# Patient Record
Sex: MALE | Race: WHITE | NOT HISPANIC OR LATINO | Employment: FULL TIME | URBAN - METROPOLITAN AREA
[De-identification: names, ages, dates, MRNs, and addresses within clinical notes are randomized per-mention and may not be internally consistent; named-entity substitution may affect disease eponyms.]

---

## 2017-01-19 ENCOUNTER — ALLSCRIPTS OFFICE VISIT (OUTPATIENT)
Dept: OTHER | Facility: OTHER | Age: 47
End: 2017-01-19

## 2017-01-19 DIAGNOSIS — R10.13 EPIGASTRIC PAIN: ICD-10-CM

## 2017-01-26 ENCOUNTER — ALLSCRIPTS OFFICE VISIT (OUTPATIENT)
Dept: OTHER | Facility: OTHER | Age: 47
End: 2017-01-26

## 2017-01-26 LAB
FLUAV AG SPEC QL IA: NEGATIVE
INFLUENZA B AG (HISTORICAL): NEGATIVE

## 2017-02-08 ENCOUNTER — APPOINTMENT (OUTPATIENT)
Dept: LAB | Facility: HOSPITAL | Age: 47
End: 2017-02-08
Attending: INTERNAL MEDICINE
Payer: COMMERCIAL

## 2017-02-08 ENCOUNTER — TRANSCRIBE ORDERS (OUTPATIENT)
Dept: ADMINISTRATIVE | Facility: HOSPITAL | Age: 47
End: 2017-02-08

## 2017-02-08 DIAGNOSIS — R10.13 EPIGASTRIC PAIN: ICD-10-CM

## 2017-02-08 DIAGNOSIS — R10.13 ABDOMINAL PAIN, EPIGASTRIC: Primary | ICD-10-CM

## 2017-02-08 DIAGNOSIS — R10.13 ABDOMINAL PAIN, EPIGASTRIC: ICD-10-CM

## 2017-02-08 LAB
ALBUMIN SERPL BCP-MCNC: 3.5 G/DL (ref 3.5–5)
ALP SERPL-CCNC: 99 U/L (ref 46–116)
ALT SERPL W P-5'-P-CCNC: 39 U/L (ref 12–78)
ANION GAP SERPL CALCULATED.3IONS-SCNC: 8 MMOL/L (ref 4–13)
AST SERPL W P-5'-P-CCNC: 19 U/L (ref 5–45)
BILIRUB SERPL-MCNC: 0.27 MG/DL (ref 0.2–1)
BUN SERPL-MCNC: 17 MG/DL (ref 5–25)
CALCIUM SERPL-MCNC: 8.2 MG/DL (ref 8.3–10.1)
CHLORIDE SERPL-SCNC: 105 MMOL/L (ref 100–108)
CO2 SERPL-SCNC: 29 MMOL/L (ref 21–32)
CREAT SERPL-MCNC: 0.92 MG/DL (ref 0.6–1.3)
GFR SERPL CREATININE-BSD FRML MDRD: >60 ML/MIN/1.73SQ M
GLUCOSE SERPL-MCNC: 93 MG/DL (ref 65–140)
LIPASE SERPL-CCNC: 311 U/L (ref 73–393)
POTASSIUM SERPL-SCNC: 4 MMOL/L (ref 3.5–5.3)
PROT SERPL-MCNC: 6.5 G/DL (ref 6.4–8.2)
SODIUM SERPL-SCNC: 142 MMOL/L (ref 136–145)

## 2017-02-08 PROCEDURE — 83690 ASSAY OF LIPASE: CPT

## 2017-02-08 PROCEDURE — 36415 COLL VENOUS BLD VENIPUNCTURE: CPT

## 2017-02-08 PROCEDURE — 83516 IMMUNOASSAY NONANTIBODY: CPT

## 2017-02-08 PROCEDURE — 82784 ASSAY IGA/IGD/IGG/IGM EACH: CPT

## 2017-02-08 PROCEDURE — 80053 COMPREHEN METABOLIC PANEL: CPT

## 2017-02-08 PROCEDURE — 86255 FLUORESCENT ANTIBODY SCREEN: CPT

## 2017-02-09 LAB
ENDOMYSIUM IGA SER QL: NEGATIVE
GLIADIN PEPTIDE IGA SER-ACNC: 4 UNITS (ref 0–19)
GLIADIN PEPTIDE IGG SER-ACNC: 2 UNITS (ref 0–19)
IGA SERPL-MCNC: 177 MG/DL (ref 90–386)
TTG IGA SER-ACNC: <2 U/ML (ref 0–3)
TTG IGG SER-ACNC: <2 U/ML (ref 0–5)

## 2017-02-10 RX ORDER — IVERMECTIN 10 MG/G
CREAM TOPICAL
COMMUNITY
End: 2022-07-14 | Stop reason: ALTCHOICE

## 2017-02-10 RX ORDER — AZELAIC ACID 0.15 G/G
GEL TOPICAL 2 TIMES DAILY
COMMUNITY
End: 2018-03-12 | Stop reason: ALTCHOICE

## 2017-02-13 ENCOUNTER — ANESTHESIA (OUTPATIENT)
Dept: GASTROENTEROLOGY | Facility: HOSPITAL | Age: 47
End: 2017-02-13
Payer: COMMERCIAL

## 2017-02-13 ENCOUNTER — ANESTHESIA EVENT (OUTPATIENT)
Dept: GASTROENTEROLOGY | Facility: HOSPITAL | Age: 47
End: 2017-02-13
Payer: COMMERCIAL

## 2017-02-13 ENCOUNTER — GENERIC CONVERSION - ENCOUNTER (OUTPATIENT)
Dept: OTHER | Facility: OTHER | Age: 47
End: 2017-02-13

## 2017-02-13 ENCOUNTER — HOSPITAL ENCOUNTER (OUTPATIENT)
Facility: HOSPITAL | Age: 47
Setting detail: OUTPATIENT SURGERY
Discharge: HOME | End: 2017-02-13
Attending: INTERNAL MEDICINE | Admitting: INTERNAL MEDICINE
Payer: COMMERCIAL

## 2017-02-13 VITALS
OXYGEN SATURATION: 98 % | WEIGHT: 158 LBS | TEMPERATURE: 97.5 F | HEIGHT: 69 IN | SYSTOLIC BLOOD PRESSURE: 114 MMHG | BODY MASS INDEX: 23.4 KG/M2 | HEART RATE: 82 BPM | RESPIRATION RATE: 16 BRPM | DIASTOLIC BLOOD PRESSURE: 71 MMHG

## 2017-02-13 DIAGNOSIS — R10.13 EPIGASTRIC PAIN: ICD-10-CM

## 2017-02-13 DIAGNOSIS — K63.5 POLYP OF COLON: ICD-10-CM

## 2017-02-13 PROCEDURE — 88342 IMHCHEM/IMCYTCHM 1ST ANTB: CPT | Performed by: INTERNAL MEDICINE

## 2017-02-13 PROCEDURE — 88313 SPECIAL STAINS GROUP 2: CPT | Performed by: INTERNAL MEDICINE

## 2017-02-13 PROCEDURE — 88305 TISSUE EXAM BY PATHOLOGIST: CPT | Performed by: INTERNAL MEDICINE

## 2017-02-13 RX ORDER — SODIUM CHLORIDE 9 MG/ML
50 INJECTION, SOLUTION INTRAVENOUS CONTINUOUS
Status: DISCONTINUED | OUTPATIENT
Start: 2017-02-13 | End: 2017-02-13 | Stop reason: HOSPADM

## 2017-02-13 RX ORDER — PROPOFOL 10 MG/ML
INJECTION, EMULSION INTRAVENOUS AS NEEDED
Status: DISCONTINUED | OUTPATIENT
Start: 2017-02-13 | End: 2017-02-13 | Stop reason: SURG

## 2017-02-13 RX ADMIN — PROPOFOL 50 MG: 10 INJECTION, EMULSION INTRAVENOUS at 14:51

## 2017-02-13 RX ADMIN — PROPOFOL 50 MG: 10 INJECTION, EMULSION INTRAVENOUS at 15:03

## 2017-02-13 RX ADMIN — PROPOFOL 50 MG: 10 INJECTION, EMULSION INTRAVENOUS at 14:55

## 2017-02-13 RX ADMIN — PROPOFOL 100 MG: 10 INJECTION, EMULSION INTRAVENOUS at 14:41

## 2017-02-13 RX ADMIN — PROPOFOL 50 MG: 10 INJECTION, EMULSION INTRAVENOUS at 14:43

## 2017-02-13 RX ADMIN — SODIUM CHLORIDE 50 ML/HR: 0.9 INJECTION, SOLUTION INTRAVENOUS at 13:51

## 2017-02-13 RX ADMIN — PROPOFOL 50 MG: 10 INJECTION, EMULSION INTRAVENOUS at 14:45

## 2017-02-13 RX ADMIN — PROPOFOL 50 MG: 10 INJECTION, EMULSION INTRAVENOUS at 14:57

## 2017-02-13 RX ADMIN — PROPOFOL 50 MG: 10 INJECTION, EMULSION INTRAVENOUS at 14:48

## 2017-02-14 ENCOUNTER — GENERIC CONVERSION - ENCOUNTER (OUTPATIENT)
Dept: OTHER | Facility: OTHER | Age: 47
End: 2017-02-14

## 2017-02-16 ENCOUNTER — GENERIC CONVERSION - ENCOUNTER (OUTPATIENT)
Dept: OTHER | Facility: OTHER | Age: 47
End: 2017-02-16

## 2017-03-17 ENCOUNTER — ALLSCRIPTS OFFICE VISIT (OUTPATIENT)
Dept: OTHER | Facility: OTHER | Age: 47
End: 2017-03-17

## 2017-06-26 ENCOUNTER — ALLSCRIPTS OFFICE VISIT (OUTPATIENT)
Dept: OTHER | Facility: OTHER | Age: 47
End: 2017-06-26

## 2017-07-11 ENCOUNTER — ALLSCRIPTS OFFICE VISIT (OUTPATIENT)
Dept: OTHER | Facility: OTHER | Age: 47
End: 2017-07-11

## 2017-09-08 ENCOUNTER — ALLSCRIPTS OFFICE VISIT (OUTPATIENT)
Dept: OTHER | Facility: OTHER | Age: 47
End: 2017-09-08

## 2017-10-17 ENCOUNTER — ALLSCRIPTS OFFICE VISIT (OUTPATIENT)
Dept: OTHER | Facility: OTHER | Age: 47
End: 2017-10-17

## 2017-10-18 NOTE — PROGRESS NOTES
Assessment  1  Anxiety disorder (300 00) (F41 9)    Plan  Anxiety disorder    · Escitalopram Oxalate 5 MG Oral Tablet; 1 tab PO daily    Discussion/Summary  Discussion Summary:   Rto 4-6months  Counseling Documentation With Imm: The patient was counseled regarding impressions  Medication SE Review and Pt Understands Tx: Possible side effects of new medications were reviewed with the patient/guardian today  The treatment plan was reviewed with the patient/guardian  The patient/guardian understands and agrees with the treatment plan      Chief Complaint  Chief Complaint Free Text Note Form: 6 week follow-up   Chief Complaint Chronic Condition [facility]: Patient is here today for follow up of chronic conditions described in HPI  History of Present Illness  HPI: Patient was started on Lexapro last month for anxietyis doing very well on thishave improved significantlypanic attackshas been able to stay over at partner's place without trouble sleepingwas actually able to go into a haunted house with his friends and felt finetrouble concentrating at worknoted resolution of loose stoolsa little fuzzy in the beginninghas noted delayed orgasm         Review of Systems  Complete-Male:   Constitutional: no fever,-- no recent weight gain-- and-- no chills  Cardiovascular: no chest pain-- and-- no palpitations  Respiratory: no shortness of breath-- and-- no cough  Gastrointestinal: no abdominal pain,-- no constipation-- and-- no diarrhea  Psychiatric: as noted in HPI  Active Problems  1  Anxiety disorder (300 00) (F41 9)   2  DM (diabetes mellitus screen) (V77 1) (Z13 1)   3  Laboratory examination ordered as part of a routine general medical examination (V72 62) (Z00 00)   4  Long term use of drug (V58 69) (Z79 899)   5  Paresthesia of both feet (782 0) (R20 2)   6  Polyp, colonic (211 3) (K63 5)   7  Rosacea (695 3) (L71 9)   8  Seizure disorder (345 90) (G40 909)    Past Medical History  1   History of Abdominal bloating (787 3) (R14 0)   2  History of Acute upper respiratory infection (465 9) (J06 9)   3  History of Asthma (493 90) (J45 909)   4  History of Atypical chest pain (786 59) (R07 89)   5  History of Dyspepsia (536 8) (R10 13)   6  History of Essential hypertriglyceridemia (272 1) (E78 1)   7  History of acute sinusitis (V12 69) (Z87 09)   8  History of epigastric pain (V12 79) (Z87 19)   9  History of Mild carpal tunnel syndrome, right (354 0) (G56 01)  Active Problems And Past Medical History Reviewed: The active problems and past medical history were reviewed and updated today  Surgical History  1  History of Dental Surgery  Surgical History Reviewed: The surgical history was reviewed and updated today  Family History  Maternal Grandmother    1  Family history of cerebrovascular accident (V17 1) (Z82 3)   2  Family history of colon cancer (V16 0) (Z80 0)  Family History Reviewed: The family history was reviewed and updated today  Social History   · Never a smoker   · Non smoker (V49 89) (Z78 9)  Social History Reviewed: The social history was reviewed and updated today  Current Meds   1  ALPRAZolam 0 25 MG Oral Tablet; 1 tab PO once daily as needed for a panic attack; Therapy: 99BAG7129 to (Evaluate:08Oct2017); Last Rx:54Meo3868 Ordered   2  CarBAMazepine  MG Oral Capsule Extended Release 12 Hour; TAKE 1 CAPSULE TWICE   DAILY; Therapy: 33Aul0203 to (Evaluate:14Oct2017)  Requested for: 24Fqj2524; Last Rx:67Cle2960   Ordered   3  Escitalopram Oxalate 5 MG Oral Tablet; 1 tab PO daily; Therapy: 73YSG2261 to (Last Rx:17Nyl0451)  Requested for: 65Bbe6832 Ordered   4  Omeprazole 20 MG Oral Capsule Delayed Release; take 1 capsule daily; Therapy: 61RUA6578 to (Evaluate:12Mar2018)  Requested for: 51PLU6181; Last Rx:17Mar2017   Ordered   5  Soolantra 1 % External Cream;   Therapy: (Recorded:19Jan2017) to Recorded  Medication List Reviewed:    The medication list was reviewed and updated today  Allergies  1  No Known Drug Allergies    Vitals  Vital Signs    Recorded: 28PAR6916 11:42AM   Heart Rate 84   Systolic 451   Diastolic 74   Height 5 ft 9 in   Weight 172 lb 8 oz   BMI Calculated 25 47   BSA Calculated 1 94   O2 Saturation 96     Physical Exam    Constitutional   General appearance: No acute distress, well appearing and well nourished  Ears, Nose, Mouth, and Throat   Otoscopic examination: Tympanic membrance translucent with normal light reflex  Canals patent without erythema  Oropharynx: Normal with no erythema, edema, exudate or lesions  Pulmonary   Respiratory effort: No increased work of breathing or signs of respiratory distress  Auscultation of lungs: Clear to auscultation, equal breath sounds bilaterally, no wheezes, no rales, no rhonci  Cardiovascular   Auscultation of heart: Normal rate and rhythm, normal S1 and S2, without murmurs  Abdomen   Abdomen: Non-tender, no masses  Musculoskeletal   Gait and station: Normal     Psychiatric   Orientation to person, place and time: Normal     Mood and affect: Normal          Health Management  History of Abdominal bloating   COLONOSCOPY; every 3 years; Last 33IYU4325; Next Due: 28Lpw2248; Active  History of Dyspepsia   COLONOSCOPY; every 3 years; Last 94AZD9117; Next Due: 07Mwx7045; Active  History of epigastric pain   COLONOSCOPY; every 3 years; Last 96MRF7339; Next Due: 40Ieq2470; Active  Polyp, colonic   COLONOSCOPY; every 3 years; Last 33WGA0273; Next Due: 06Jyz6140; Active    Future Appointments    Date/Time Provider Specialty Site   03/12/2018 08:00 AM JAYA Vu   Internal Medicine [facility]     Signatures   Electronically signed by : JAYA Babcock ; Oct 17 2017  3:29PM EST                       (Author)

## 2017-11-16 ENCOUNTER — GENERIC CONVERSION - ENCOUNTER (OUTPATIENT)
Dept: OTHER | Facility: OTHER | Age: 47
End: 2017-11-16

## 2018-01-10 NOTE — RESULT NOTES
Message   negative     Verified Results  (1) COMPREHENSIVE METABOLIC PANEL 45AWB3234 50:90CX [employer] Order Number: [id number]     Test Name Result Flag Reference   GLUCOSE,RANDM 93 mg/dL     If the patient is fasting, the ADA then defines impaired fasting glucose as > 100 mg/dL and diabetes as > or equal to 123 mg/dL  SODIUM 142 mmol/L  136-145   POTASSIUM 4 0 mmol/L  3 5-5 3   CHLORIDE 105 mmol/L  100-108   CARBON DIOXIDE 29 mmol/L  21-32   ANION GAP (CALC) 8 mmol/L  4-13   BLOOD UREA NITROGEN 17 mg/dL  5-25   CREATININE 0 92 mg/dL  0 60-1 30   Standardized to IDMS reference method   CALCIUM 8 2 mg/dL L 8 3-10 1   BILI, TOTAL 0 27 mg/dL  0 20-1 00   ALK PHOSPHATAS 99 U/L     ALT (SGPT) 39 U/L  12-78   AST(SGOT) 19 U/L  5-45   ALBUMIN 3 5 g/dL  3 5-5 0   TOTAL PROTEIN 6 5 g/dL  6 4-8 2   eGFR Non-African American      >60 0 ml/min/1 73sq [facility] [employer] Disease Education Program recommendations are as follows:  GFR calculation is accurate only with a steady state creatinine  Chronic Kidney disease less than 60 ml/min/1 73 sq  meters  Kidney failure less than 15 ml/min/1 73 sq  meters  (1) LIPASE 46NPA0666 08:11AM [employer] Order Number: [id number]     Test Name Result Flag Reference   LIPASE 311 u/L       (1) CELIAC DISEASE AB PROFILE 75AVF1886 08:11AM Nannette Bracket     Test Name Result Flag Reference   tTG IGG <2 U/mL  0 - 5   Negative        0 - 5                                Weak Positive   6 - 9                                Positive           >9   tTG IGA <2 U/mL  0 - 3   Negative        0 -  3                                Weak Positive   4 - 10                                Positive           >10   Tissue Transglutaminase (tTG) has been identified   as the endomysial antigen  Studies have demonstr-   ated that endomysial IgA antibodies have over 99%   specificity for gluten sensitive enteropathy     GLIADA 4 units  0 - 19   Negative 0 - 19                     Weak Positive             20 - 30                     Moderate to Strong Positive   >30   GLIADG 2 units  0 - 19   Negative                   0 - 19                     Weak Positive             20 - 30                     Moderate to Strong Positive   >30   ENDOMYSIAL AB IGA Negative  Negative   Performed at:  [address] [place] [address]  [id number]  : Ksenia Jimenez MD, Phone:  [phone]    mg/dL  90 - 386

## 2018-01-10 NOTE — RESULT NOTES
Message   Within normal limits  Verified Results  (1) COMPREHENSIVE METABOLIC PANEL 44AVS5494 14:21BE Karl Rangeljuliette Order Number: [id number]     Test Name Result Flag Reference   GLUCOSE,RANDM 93 mg/dL     If the patient is fasting, the ADA then defines impaired fasting glucose as > 100 mg/dL and diabetes as > or equal to 123 mg/dL  SODIUM 142 mmol/L  136-145   POTASSIUM 4 0 mmol/L  3 5-5 3   CHLORIDE 105 mmol/L  100-108   CARBON DIOXIDE 29 mmol/L  21-32   ANION GAP (CALC) 8 mmol/L  4-13   BLOOD UREA NITROGEN 17 mg/dL  5-25   CREATININE 0 92 mg/dL  0 60-1 30   Standardized to IDMS reference method   CALCIUM 8 2 mg/dL L 8 3-10 1   BILI, TOTAL 0 27 mg/dL  0 20-1 00   ALK PHOSPHATAS 99 U/L     ALT (SGPT) 39 U/L  12-78   AST(SGOT) 19 U/L  5-45   ALBUMIN 3 5 g/dL  3 5-5 0   TOTAL PROTEIN 6 5 g/dL  6 4-8 2   eGFR Non-African American      >60 0 ml/min/1 73sq [facility] Energy Disease Education Program recommendations are as follows:  GFR calculation is accurate only with a steady state creatinine  Chronic Kidney disease less than 60 ml/min/1 73 sq  meters  Kidney failure less than 15 ml/min/1 73 sq  meters       (1) LIPASE 72HSS2432 08:11AM Karl Ruby Order Number: [id number]     Test Name Result Flag Reference   LIPASE 311 u/L

## 2018-01-10 NOTE — RESULT NOTES
Message   Stress test is normal        Verified Results  STRESS TEST ONLY, EXERCISE 98EXJ8601 02:10PM Daisha Islas Order Number: [id number]     Test Name Result Flag Reference   STRESS TEST ONLY, EXERCISE (Report)     [facility]   [address]  [place], [address]   [phone]     Stress Electrocardiography during Exercise     Name: Mathew Maurice   MR #: [id number]   Account #: [de-identified]   Study date: 2016   : 1970   Age: 41 years   Gender: Male   Height: 69 in   Weight: 167 lb   BSA: 1 92 m squared     Allergies: ALLERGIES NOT ON FILE     Diagnosis: 786 50 - CHEST PAIN NOS     Primary Physician: Trinity Shipley MD   Referring Physician: Trinity Shipley MD   Technician: Karolina Hannah   GROUP: [facility]   Interpreting Physician: Twin Johansen MD   Report Prepared By[de-identified] Karolina Hannah   Ordering Physician: Twin Johansen MD     CLINICAL QUESTION: Detection of coronary artery disease  HISTORY: The patient is a 41year old  male  Chest pain status: chest   pain, consistent with atypical angina  Coronary artery disease risk factors:   none  Cardiovascular history: none significant  REST ECG: Normal baseline ECG  PROCEDURE: Treadmill exercise testing was performed, using the Zbigniew protocol  Stress electrocardiographic evaluation was performed  ZBIGNIEW PROTOCOL:   HR bpm SBP mmHg DBP mmHg Symptoms   Baseline 101 146 100 none   Stage 1 110 178 80 none   Stage 2 137 183 82 none   Stage 3 155 212 87 leg pain   Stage 4 171 189 85 same as above   Immediate 171 212 87 --   Recovery 1 118 171 84 subsiding   Recovery 2 105 156 77 none   FAREED Vasquez-C was present throughout testing  Pre Test POX 98%   Post Test POX 96% No medications or fluids given  STRESS SUMMARY: Stage 1 was advanced per Tamiko Sepulveda PA-C Duration of exercise   was 9 min   Functional capacity was normal  Maximal heart rate during stress was 171 bpm ( 97 % of maximal predicted heart rate)  The heart rate response to   stress was normal  There was resting hypertension with a hypertensive blood   pressure response to stress  The rate-pressure product for the peak heart rate   and blood pressure was 52822  There was no chest pain during stress  The stress   test was terminated due to achievement of maximal (symptom limited) exercise,   achievement of target heart rate, and moderate fatigue  The stress ECG was   negative for ischemia  Mildly hypertensive response to exercise  There were no   stress arrhythmias or conduction abnormalities  SUMMARY:   - Rest ECG: Normal baseline ECG  - Stress results: Duration of exercise was 9 min  Target heart rate was   achieved  There was resting hypertension with a hypertensive blood pressure   response to stress  There was no chest pain during stress  The stress test was   terminated due to achievement of maximal (symptom limited) exercise,   achievement of target heart rate, and moderate fatigue  - ECG conclusions: The   stress ECG was negative for ischemia  Mildly hypertensive response to exercise  IMPRESSIONS: Normal study after maximal exercise without reproduction of   symptoms  PREVIOUS STUDY COMPARISON: No previous study is available for comparison       Prepared and signed by     River Salvador MD   Signed 06/14/2016 15:58:43       Signatures   Electronically signed by : JAYA Rojas ; Jun 14 2016  8:17PM EST                       (Author)

## 2018-01-12 VITALS
WEIGHT: 169.13 LBS | OXYGEN SATURATION: 98 % | HEIGHT: 69 IN | DIASTOLIC BLOOD PRESSURE: 80 MMHG | SYSTOLIC BLOOD PRESSURE: 152 MMHG | HEART RATE: 82 BPM | BODY MASS INDEX: 25.05 KG/M2

## 2018-01-12 VITALS
BODY MASS INDEX: 24.29 KG/M2 | SYSTOLIC BLOOD PRESSURE: 104 MMHG | HEIGHT: 69 IN | DIASTOLIC BLOOD PRESSURE: 68 MMHG | TEMPERATURE: 97.1 F | WEIGHT: 164 LBS | OXYGEN SATURATION: 99 % | HEART RATE: 85 BPM | RESPIRATION RATE: 18 BRPM

## 2018-01-12 VITALS
WEIGHT: 172.5 LBS | DIASTOLIC BLOOD PRESSURE: 74 MMHG | OXYGEN SATURATION: 96 % | HEIGHT: 69 IN | HEART RATE: 84 BPM | BODY MASS INDEX: 25.55 KG/M2 | SYSTOLIC BLOOD PRESSURE: 126 MMHG

## 2018-01-12 VITALS
HEART RATE: 99 BPM | BODY MASS INDEX: 24.59 KG/M2 | OXYGEN SATURATION: 98 % | SYSTOLIC BLOOD PRESSURE: 132 MMHG | TEMPERATURE: 99.3 F | DIASTOLIC BLOOD PRESSURE: 84 MMHG | HEIGHT: 69 IN | WEIGHT: 166 LBS

## 2018-01-13 VITALS
DIASTOLIC BLOOD PRESSURE: 74 MMHG | BODY MASS INDEX: 25.53 KG/M2 | TEMPERATURE: 96.8 F | SYSTOLIC BLOOD PRESSURE: 132 MMHG | WEIGHT: 172.38 LBS | HEART RATE: 85 BPM | OXYGEN SATURATION: 99 % | HEIGHT: 69 IN

## 2018-01-13 VITALS
HEART RATE: 80 BPM | DIASTOLIC BLOOD PRESSURE: 90 MMHG | TEMPERATURE: 98.4 F | SYSTOLIC BLOOD PRESSURE: 120 MMHG | WEIGHT: 169.04 LBS | RESPIRATION RATE: 18 BRPM | HEIGHT: 69 IN | OXYGEN SATURATION: 97 % | BODY MASS INDEX: 25.04 KG/M2

## 2018-01-14 VITALS
OXYGEN SATURATION: 96 % | DIASTOLIC BLOOD PRESSURE: 80 MMHG | WEIGHT: 167.38 LBS | HEIGHT: 69 IN | TEMPERATURE: 99 F | SYSTOLIC BLOOD PRESSURE: 154 MMHG | BODY MASS INDEX: 24.79 KG/M2 | HEART RATE: 83 BPM

## 2018-01-14 NOTE — RESULT NOTES
Verified Results  COLONOSCOPY [id number] 02:10PM Alli Mcdaniel     Test Name Result Flag Reference   Colonoscopy 02/13/2017        Summary / No summary entered :      No summary entered   Documents attached :      EPIC OP CATRACHO Wiggins; Enc: [id number] - Appointment - Jemma Moreno - (Gastroenterology Adult) (Additional Information Document)

## 2018-01-17 NOTE — RESULT NOTES
Message   Repeat in 3 years as tubular adenoma ~10 mm was found     Verified Results  (1) TISSUE EXAM [id number] 02:45PM Kerrie Nicolas     Test Name Result Flag Reference   LAB AP CASE REPORT (Report)     Surgical Pathology Report             Case: [id number]                   Authorizing Provider: Eufemia Glover MD      Collected:      02/13/2017 1445        Ordering Location:   [address] [place] [address]   Received:      02/14/2017 [place] [address] Endoscopy                               Pathologist:      Deni Santana MD                             Specimens:  A) - Duodenum, Duodenum, r/o celiac                                  B) - Stomach, Erosive gastritis                                    C) - Large Intestine, Transverse Colon, Transverse colon polyp   LAB AP FINAL DIAGNOSIS (Report)     A  Duodenum, biopsy:  - Benign duodenal mucosa  - No villous atrophy, intraepithelial lymphocytosis or crypt hyperplasia;   negative for features of malabsorptive enteropathy   - Negative for chronic or active duodenitis, dysplasia or malignancy  B  Stomach, biopsy:  - Chronic inactive oxyntic and antral gastritis  - Negative for Helicobacter pylori, confirmed by immunohistochemical   stain, evaluated with appropriate positive controls  - Negative for atrophy, intestinal metaplasia, dysplasia or carcinoma  - Alcian blue/PAS stain is negative for intestinal type mucin  C  Colon, transverse polyp, biopsy:  - Tubular adenoma, negative for high-grade dysplasia  Electronically signed by Deni Santana MD on 2/15/2017 at 1:02 PM   LAB AP SURGICAL ADDITIONAL INFORMATION (Report)     These tests were developed and their performance characteristics   determined by [facility] or [facility]  They may not be cleared or approved by the [place]  Food and   Drug Administration  The FDA has determined that such clearance or   approval is not necessary   These tests are used for clinical purposes  They should not be regarded as investigational or for research  This   laboratory has been approved by BAR 88, designated as a high-complexity   laboratory and is qualified to perform these tests  Interpretation performed at , [address]   LAB AP GROSS DESCRIPTION (Report)     A  The specimen is received in formalin, labeled with the patient's name   and hospital number, and is designated duodenal biopsy, rule out   celiac  The specimen consists of multiple white to tan, friable soft   tissue fragments measuring in aggregate 0 7 x 0 6 x 0 2 cm in greatest   dimension  Entirely submitted  One cassette  B  The specimen is received in formalin, labeled with the patient's name   and hospital number, and is designated Stomach biopsy, erosive   gastritis  The specimen consists of 5 white to tan soft tissue fragments   measuring 0 2, 0 2, 0 3, 0 4 and 0 7 centimeters in greatest dimension  Entirely submitted  One cassette  Note: The estimated total formalin fixation time based upon information   provided by the submitting clinician and the standard processing schedule   is 23 25 hours  C  The specimen is received in formalin, labeled with the patient's name   and hospital number, and is designated Transverse colon polyp  The   specimen consists of a single white to tan soft tissue fragment measuring   0 7 x 0 5 x 0 3 cm in greatest dimension  Entirely submitted  One   cassette  Note: The estimated total formalin fixation time based upon information   provided by the submitting clinician and the standard processing schedule   is 23 0 hours        ACL

## 2018-01-22 VITALS
DIASTOLIC BLOOD PRESSURE: 80 MMHG | SYSTOLIC BLOOD PRESSURE: 144 MMHG | WEIGHT: 176 LBS | BODY MASS INDEX: 26.07 KG/M2 | HEART RATE: 87 BPM | OXYGEN SATURATION: 98 % | HEIGHT: 69 IN

## 2018-03-12 ENCOUNTER — OFFICE VISIT (OUTPATIENT)
Dept: INTERNAL MEDICINE CLINIC | Facility: CLINIC | Age: 48
End: 2018-03-12
Payer: COMMERCIAL

## 2018-03-12 VITALS
WEIGHT: 184.6 LBS | BODY MASS INDEX: 27.34 KG/M2 | HEART RATE: 84 BPM | SYSTOLIC BLOOD PRESSURE: 136 MMHG | HEIGHT: 69 IN | OXYGEN SATURATION: 98 % | DIASTOLIC BLOOD PRESSURE: 92 MMHG | TEMPERATURE: 98.6 F

## 2018-03-12 DIAGNOSIS — Z12.11 SCREEN FOR COLON CANCER: ICD-10-CM

## 2018-03-12 DIAGNOSIS — Z00.00 WELLNESS EXAMINATION: Primary | ICD-10-CM

## 2018-03-12 DIAGNOSIS — Z11.4 ENCOUNTER FOR SCREENING FOR HIV: ICD-10-CM

## 2018-03-12 DIAGNOSIS — G40.909 SEIZURE DISORDER (HCC): ICD-10-CM

## 2018-03-12 DIAGNOSIS — F41.1 GENERALIZED ANXIETY DISORDER: ICD-10-CM

## 2018-03-12 DIAGNOSIS — Z13.1 SCREENING FOR DIABETES MELLITUS: ICD-10-CM

## 2018-03-12 DIAGNOSIS — Z13.220 SCREENING, LIPID: ICD-10-CM

## 2018-03-12 PROBLEM — F41.9 ANXIETY DISORDER: Status: ACTIVE | Noted: 2017-09-08

## 2018-03-12 PROCEDURE — 99396 PREV VISIT EST AGE 40-64: CPT | Performed by: INTERNAL MEDICINE

## 2018-03-12 RX ORDER — ESCITALOPRAM OXALATE 5 MG/1
TABLET ORAL
COMMUNITY
Start: 2018-03-11 | End: 2018-11-30 | Stop reason: SDUPTHER

## 2018-03-12 NOTE — PROGRESS NOTES
Assessment/Plan:    Screen for colon cancer  Colonoscopy 2017    Encounter for screening for HIV  [facility] 2017    Wellness examination  Well adult  Blood work due for screening diabetes and lipids  Up to date with flu vaccine  Up to date with STD screening    Seizure disorder ([facility] [address] )  Stable on Tegretol, managed by neurology    Anxiety disorder  Stable on Lexapro         Problem List Items Addressed This Visit     Wellness examination - Primary     Well adult  Blood work due for screening diabetes and lipids  Up to date with flu vaccine  Up to date with STD screening         Anxiety disorder     Stable on Lexapro         Relevant Medications    escitalopram (LEXAPRO) 5 mg tablet    Other Relevant Orders    CBC and differential    Comprehensive metabolic panel    Lipid panel    Seizure disorder ([facility] [address] )     Stable on Tegretol, managed by neurology         Relevant Orders    CBC and differential    Comprehensive metabolic panel    Lipid panel    Screening, lipid    Relevant Orders    CBC and differential    Comprehensive metabolic panel    Lipid panel    Screening for diabetes mellitus    Relevant Orders    CBC and differential    Comprehensive metabolic panel    Lipid panel    Screen for colon cancer     Colonoscopy 2017         Encounter for screening for HIV     [facility] 2017                 Subjective:      Patient ID: Nain Muhammad is a 56 y o  male      HPI  Here for a wellness visit  Regular diet  Trying to go back to regular exercise  Recent weight gain  Up to date with dental visits and eye exams  Last lipid and diabetes screening in 2016  Colonoscopy last year - +polyp tubular adenoma  [facility]  Went to [facility] , last visit and had STD screening in October  Up to date with flu vaccine    The following portions of the patient's history were reviewed and updated as appropriate: allergies, current medications, past family history, past medical history, past social history, past surgical history and problem list     Review of Systems   Constitutional: Negative for fatigue, fever and unexpected weight change  HENT: Negative for ear pain, hearing loss, sinus pain, sinus pressure and sore throat  Respiratory: Negative for cough, shortness of breath and wheezing  Cardiovascular: Negative for chest pain, palpitations and leg swelling  Gastrointestinal: Negative for abdominal pain, blood in stool, constipation, diarrhea, nausea and vomiting  Takes Metamucil daily but has loose stools  Last episode of blood in stool in January after a bout of diarrhea during a cruise   Genitourinary: Negative for difficulty urinating  Musculoskeletal: Negative for arthralgias and myalgias  Psychiatric/Behavioral: The patient is nervous/anxious (mild, stable on Lexapro)  Objective:      /92   Pulse 84   Temp 98 6 °F (37 °C) (Oral)   Ht 5' 9" (1 753 m)   Wt 83 7 kg (184 lb 9 6 oz)   SpO2 98%   BMI 27 26 kg/m²          Physical Exam   Constitutional: He is oriented to person, place, and time  He appears well-developed and well-nourished  HENT:   Head: Normocephalic and atraumatic  Right Ear: External ear normal    Left Ear: External ear normal    Mouth/Throat: Oropharynx is clear and moist    Eyes: Conjunctivae are normal    Neck: Neck supple  Cardiovascular: Normal rate, regular rhythm and normal heart sounds  No murmur heard  Pulmonary/Chest: Effort normal and breath sounds normal  No respiratory distress  He has no wheezes  He has no rales  Abdominal: Soft  Bowel sounds are normal  He exhibits no distension and no mass  There is no tenderness  There is no rebound and no guarding  Musculoskeletal: Normal range of motion  Neurological: He is alert and oriented to person, place, and time  Skin: Skin is warm and dry  Psychiatric: He has a normal mood and affect   His behavior is normal  Judgment and thought content normal

## 2018-03-12 NOTE — ASSESSMENT & PLAN NOTE
Well adult  Blood work due for screening diabetes and lipids  Up to date with flu vaccine  Up to date with STD screening

## 2018-03-16 ENCOUNTER — APPOINTMENT (OUTPATIENT)
Dept: LAB | Facility: HOSPITAL | Age: 48
End: 2018-03-16
Payer: COMMERCIAL

## 2018-03-16 LAB
ALBUMIN SERPL BCP-MCNC: 3.7 G/DL (ref 3.5–5)
ALP SERPL-CCNC: 109 U/L (ref 46–116)
ALT SERPL W P-5'-P-CCNC: 35 U/L (ref 12–78)
ANION GAP SERPL CALCULATED.3IONS-SCNC: 6 MMOL/L (ref 4–13)
AST SERPL W P-5'-P-CCNC: 35 U/L (ref 5–45)
BASOPHILS # BLD MANUAL: 0 THOUSAND/UL (ref 0–0.1)
BASOPHILS NFR MAR MANUAL: 0 % (ref 0–1)
BILIRUB SERPL-MCNC: 0.43 MG/DL (ref 0.2–1)
BUN SERPL-MCNC: 13 MG/DL (ref 5–25)
CALCIUM SERPL-MCNC: 8.8 MG/DL (ref 8.3–10.1)
CHLORIDE SERPL-SCNC: 104 MMOL/L (ref 100–108)
CHOLEST SERPL-MCNC: 211 MG/DL (ref 50–200)
CO2 SERPL-SCNC: 28 MMOL/L (ref 21–32)
CREAT SERPL-MCNC: 0.82 MG/DL (ref 0.6–1.3)
EOSINOPHIL # BLD MANUAL: 0.19 THOUSAND/UL (ref 0–0.4)
EOSINOPHIL NFR BLD MANUAL: 3 % (ref 0–6)
ERYTHROCYTE [DISTWIDTH] IN BLOOD BY AUTOMATED COUNT: 12.6 % (ref 11.6–15.1)
GFR SERPL CREATININE-BSD FRML MDRD: 106 ML/MIN/1.73SQ M
GLUCOSE P FAST SERPL-MCNC: 94 MG/DL (ref 65–99)
HCT VFR BLD AUTO: 44.1 % (ref 36.5–49.3)
HDLC SERPL-MCNC: 62 MG/DL (ref 40–60)
HGB BLD-MCNC: 15.4 G/DL (ref 12–17)
LDLC SERPL CALC-MCNC: 116 MG/DL (ref 0–100)
LG PLATELETS BLD QL SMEAR: PRESENT
LYMPHOCYTES # BLD AUTO: 1.04 THOUSAND/UL (ref 0.6–4.47)
LYMPHOCYTES # BLD AUTO: 16 % (ref 14–44)
MCH RBC QN AUTO: 31.1 PG (ref 26.8–34.3)
MCHC RBC AUTO-ENTMCNC: 34.9 G/DL (ref 31.4–37.4)
MCV RBC AUTO: 89 FL (ref 82–98)
MONOCYTES # BLD AUTO: 0.52 THOUSAND/UL (ref 0–1.22)
MONOCYTES NFR BLD: 8 % (ref 4–12)
NEUTROPHILS # BLD MANUAL: 4.73 THOUSAND/UL (ref 1.85–7.62)
NEUTS SEG NFR BLD AUTO: 73 % (ref 43–75)
NRBC BLD AUTO-RTO: 0 /100 WBCS
PLATELET # BLD AUTO: 178 THOUSANDS/UL (ref 149–390)
PLATELET BLD QL SMEAR: ADEQUATE
PMV BLD AUTO: 10.9 FL (ref 8.9–12.7)
POTASSIUM SERPL-SCNC: 4.2 MMOL/L (ref 3.5–5.3)
PROT SERPL-MCNC: 7 G/DL (ref 6.4–8.2)
RBC # BLD AUTO: 4.95 MILLION/UL (ref 3.88–5.62)
SODIUM SERPL-SCNC: 138 MMOL/L (ref 136–145)
TOTAL CELLS COUNTED SPEC: 100
TRIGL SERPL-MCNC: 163 MG/DL
WBC # BLD AUTO: 6.48 THOUSAND/UL (ref 4.31–10.16)

## 2018-03-16 PROCEDURE — 80053 COMPREHEN METABOLIC PANEL: CPT | Performed by: INTERNAL MEDICINE

## 2018-03-16 PROCEDURE — 80061 LIPID PANEL: CPT | Performed by: INTERNAL MEDICINE

## 2018-03-16 PROCEDURE — 36415 COLL VENOUS BLD VENIPUNCTURE: CPT | Performed by: INTERNAL MEDICINE

## 2018-03-16 PROCEDURE — 85007 BL SMEAR W/DIFF WBC COUNT: CPT | Performed by: INTERNAL MEDICINE

## 2018-03-16 PROCEDURE — 85027 COMPLETE CBC AUTOMATED: CPT | Performed by: INTERNAL MEDICINE

## 2018-04-06 DIAGNOSIS — G40.909 SEIZURE DISORDER (HCC): Primary | ICD-10-CM

## 2018-04-09 RX ORDER — CARBAMAZEPINE 300 MG/1
CAPSULE, EXTENDED RELEASE ORAL
Qty: 60 CAPSULE | Refills: 2 | Status: SHIPPED | OUTPATIENT
Start: 2018-04-09 | End: 2018-07-06 | Stop reason: SDUPTHER

## 2018-05-18 ENCOUNTER — OFFICE VISIT (OUTPATIENT)
Dept: INTERNAL MEDICINE CLINIC | Facility: CLINIC | Age: 48
End: 2018-05-18
Payer: COMMERCIAL

## 2018-05-18 VITALS
WEIGHT: 184.2 LBS | OXYGEN SATURATION: 98 % | DIASTOLIC BLOOD PRESSURE: 98 MMHG | SYSTOLIC BLOOD PRESSURE: 130 MMHG | BODY MASS INDEX: 27.28 KG/M2 | HEART RATE: 78 BPM | HEIGHT: 69 IN

## 2018-05-18 DIAGNOSIS — K60.2 ANAL FISSURE, UNSPECIFIED: Primary | ICD-10-CM

## 2018-05-18 PROCEDURE — 1036F TOBACCO NON-USER: CPT | Performed by: INTERNAL MEDICINE

## 2018-05-18 PROCEDURE — 3008F BODY MASS INDEX DOCD: CPT | Performed by: INTERNAL MEDICINE

## 2018-05-18 PROCEDURE — 99213 OFFICE O/P EST LOW 20 MIN: CPT | Performed by: INTERNAL MEDICINE

## 2018-05-18 RX ORDER — SULFACETAMIDE SODIUM, SULFUR 98; 48 MG/G; MG/G
LIQUID TOPICAL
COMMUNITY
Start: 2018-05-15

## 2018-05-18 RX ORDER — METRONIDAZOLE 10 MG/G
GEL TOPICAL
COMMUNITY
Start: 2018-05-08

## 2018-05-18 NOTE — PATIENT INSTRUCTIONS
Anal Fissure   WHAT YOU NEED TO KNOW:   What is an anal fissure? An anal fissure is a cut or tear in the tissue inside your anus  An anal fissure may be acute or chronic  An acute anal fissure is usually small and shallow and often heals without treatment  A chronic fissure may last longer than a month and will usually require treatment  A chronic anal fissure comes back after treatment  What causes an anal fissure? Anal fissures may occur when your anal muscle becomes too tight  Your anal muscle forms a ring around your anus and helps control your bowel movements  When this muscle becomes too tight, there is decreased blood flow to your anus  You may also have too much pressure around your anus  Other possible causes may include any of the following:  · Bowel problems:  Constipation may cause you to strain, which may cause an anal fissure  Certain bowel diseases may also cause anal fissures, including Crohn disease and inflammatory bowel disease  · Cancer:  Cancer in your anus may cause your anal tissue to tear  Leukemia is another cancer that may cause you to have an anal fissure  Treatments for cancer, such as chemotherapy, may also cause an anal fissure  · Infections:  Certain infections, such as HIV, syphilis, and tuberculosis may increase your risk for an anal fissure  · Trauma: The area around your anus may tear when you give birth  Anal trauma may also be caused by anal intercourse  What are the signs and symptoms of an anal fissure? · Pain that lasts several hours around your anus after you have a bowel movement    · Bleeding from your anus    · Bright red blood in your bowel movement or spots of blood on your toilet paper    · Pain while you urinate or have sex    · Spasms in your anus    · Itching around your anus  How is an anal fissure diagnosed? Your healthcare provider will ask about your symptoms and when they started   He may ask about your bowel movements, diet, and medicines that you take  He may also ask if you have any other medical conditions  Tell your healthcare provider if you have had any procedure or surgery done in or around your anus  Your healthcare provider will look at your anus to check for cuts or tears  He may put a gloved finger inside your anus to check for a tear or cut in your anus  If you are in severe pain, you may get local anesthesia  Your healthcare provider may also remove a piece of anal tissue and send it to a lab for testing  How is an anal fissure treated? You may also need to have the cause of your anal fissure treated  You may need any of the following to treat your anal fissure:  · Home care:      ¨ Sitz bath: You may need to soak in a warm tub or take a sitz bath  A sitz bath may decrease your pain and relax your anal muscle  You may need to do this more than once a day  Ask your healthcare provider for information on how to use a sitz bath and how often you should bathe  ¨ Nutrition:  Eat foods that are high in fiber  This will help keep your bowel movements soft  High-fiber foods include fruits, vegetables, and whole grains  ¨ Drink more liquids:  Liquids may help soften your bowel movements  This will help prevent you from straining  Ask your healthcare provider how much liquid you should drink each day  · Medicine:      ¨ Stool softeners: Your healthcare provider may also give you medicine that makes your bowel movements softer  This helps prevent constipation  You will be less likely to strain and cause an anal fissure if you are not constipated  ¨ Pain medicine: Your healthcare provider may give you medicine to take away or decrease your pain  He will tell you how much to take and how often to take it  Take the medicine exactly as directed  Tell your healthcare provider if the pain medicine does not help, or if your pain comes back too soon  ¨ Topical medicine:  Topical medicine may be put just inside your anus   The medicine may help your anal muscle relax and increase blood flow to your anus  This medicine may contain anesthesia to help decrease your pain  Your healthcare provider will teach you the right way to use topical medicine  ¨ Botulinum toxin:  This is medicine given as a shot into the skin around your anus  It helps your anal muscle relax  Ask your healthcare provider for more information about botulinum toxin  · Surgery: You may need surgery if other treatments do not work  You may also need surgery if your anal fissure is very painful  Surgery is often used for chronic anal fissures  The most common surgery is called a lateral internal sphincterotomy  A small part of your anal muscle is cut to help relax your anal muscle and decrease your pain  Your healthcare provider may remove all or some of your anal fissure  This is called a fissurectomy  What are the risks of an anal fissure? · Topical medicine for your anal fissure may give you a headache or make you feel lightheaded  Your skin may become red and you may also have a burning feeling on your anus  The botulinum toxin shot may hurt and may cause muscle weakness  It may also cause a painful infection of the tissue covering your anal muscles  You may also be less able to feel the area in and around your anus  Botulinum toxin or surgery may make you unable to control your bowel movements or passing gas  This is called incontinence  Surgery may also cause bleeding, an infection, or injury to your anal tissue  Even with treatment, your anal fissure may occur again  · Without treatment, your anal fissure may deepen or become infected  You may develop an abnormal opening from your anus to nearby organs  Scar tissue may form in your anus and cause it to become narrow  Without treatment, you may have worse pain during bowel movements  When should I contact my healthcare provider? · You are unable to have a bowel movement      · You have spasms in your anus that do not stop   When should I seek immediate care or call 911? · You have very bad pain in or around your anus  · You have bleeding from your anus that does not stop  CARE AGREEMENT:   You have the right to help plan your care  Learn about your health condition and how it may be treated  Discuss treatment options with your caregivers to decide what care you want to receive  You always have the right to refuse treatment  The above information is an  only  It is not intended as medical advice for individual conditions or treatments  Talk to your doctor, nurse or pharmacist before following any medical regimen to see if it is safe and effective for you  © 2017 [employer]  Information is for End User's use only and may not be sold, redistributed or otherwise used for commercial purposes  All illustrations and images included in CareNotes® are the copyrighted property of [employer]  or [employer]

## 2018-05-18 NOTE — PROGRESS NOTES
Assessment/Plan:  Continue Sitz baths  Start Rectiv  Call if no improvement in the next 1-2 weeks       Problem List Items Addressed This Visit     Anal fissure, unspecified - Primary    Relevant Medications    nitroglycerin (RECTIV) 0 4 %            Subjective:      Patient ID: Pepper Morillo is a 56 y o  male  HPI  Yesterday, started with rectal /perineal pain  Hurts with sitting 4-5/10  Non radiating  Regular BMs, no pain with defecation, denies blood in stool  Denies dysuria, urgency  +mild  Dribbling  Denies recent intercourse/possibly trauma    The following portions of the patient's history were reviewed and updated as appropriate: allergies, current medications, past family history, past medical history, past social history, past surgical history and problem list     Review of Systems   Constitutional: Negative for fatigue, fever and unexpected weight change  HENT: Negative for ear pain, hearing loss, sinus pain, sinus pressure and sore throat  Respiratory: Negative for cough, shortness of breath and wheezing  Cardiovascular: Negative for chest pain, palpitations and leg swelling  Gastrointestinal: Negative for abdominal pain, blood in stool, constipation, diarrhea, nausea and vomiting  Rectal pain   Genitourinary: Negative for difficulty urinating and dysuria  Musculoskeletal: Negative for arthralgias and myalgias  Neurological: Negative for dizziness and headaches  Objective:      /98 (BP Location: Left arm, Patient Position: Sitting, Cuff Size: Large)   Pulse 78   Ht 5' 9" (1 753 m)   Wt 83 6 kg (184 lb 3 2 oz)   SpO2 98%   BMI 27 20 kg/m²          Physical Exam   Constitutional: He is oriented to person, place, and time  He appears well-developed and well-nourished  HENT:   Head: Normocephalic and atraumatic  Eyes: Conjunctivae are normal    Cardiovascular: Normal rate  No murmur heard  Pulmonary/Chest: Effort normal    Abdominal: Soft   He exhibits no distension and no mass  There is no tenderness  There is no rebound and no guarding  Genitourinary: Prostate normal  Rectal exam shows fissure (small posterior)  Rectal exam shows guaiac negative stool  Neurological: He is alert and oriented to person, place, and time  Skin: Skin is warm and dry  Psychiatric: He has a normal mood and affect   His behavior is normal  Judgment and thought content normal

## 2018-07-06 DIAGNOSIS — G40.909 SEIZURE DISORDER (HCC): ICD-10-CM

## 2018-07-06 RX ORDER — CARBAMAZEPINE 300 MG/1
CAPSULE, EXTENDED RELEASE ORAL
Qty: 60 CAPSULE | Refills: 2 | Status: SHIPPED | OUTPATIENT
Start: 2018-07-06 | End: 2018-10-14 | Stop reason: SDUPTHER

## 2018-10-14 DIAGNOSIS — G40.909 SEIZURE DISORDER (HCC): ICD-10-CM

## 2018-10-14 RX ORDER — CARBAMAZEPINE 300 MG/1
CAPSULE, EXTENDED RELEASE ORAL
Qty: 60 CAPSULE | Refills: 2 | Status: SHIPPED | OUTPATIENT
Start: 2018-10-14 | End: 2019-01-11 | Stop reason: SDUPTHER

## 2018-11-30 DIAGNOSIS — F41.1 GENERALIZED ANXIETY DISORDER: Primary | ICD-10-CM

## 2018-11-30 RX ORDER — ESCITALOPRAM OXALATE 5 MG/1
TABLET ORAL
Qty: 90 TABLET | Refills: 3 | Status: SHIPPED | OUTPATIENT
Start: 2018-11-30 | End: 2019-11-20 | Stop reason: SDUPTHER

## 2018-12-09 ENCOUNTER — HOSPITAL ENCOUNTER (EMERGENCY)
Facility: HOSPITAL | Age: 48
Discharge: HOME | End: 2018-12-09
Payer: COMMERCIAL

## 2018-12-09 VITALS
RESPIRATION RATE: 16 BRPM | DIASTOLIC BLOOD PRESSURE: 93 MMHG | SYSTOLIC BLOOD PRESSURE: 158 MMHG | OXYGEN SATURATION: 97 % | HEART RATE: 83 BPM | BODY MASS INDEX: 27.32 KG/M2 | TEMPERATURE: 96.6 F | WEIGHT: 185 LBS

## 2018-12-09 DIAGNOSIS — S61.012A LACERATION OF LEFT THUMB: Primary | ICD-10-CM

## 2018-12-09 PROCEDURE — 99282 EMERGENCY DEPT VISIT SF MDM: CPT

## 2018-12-09 NOTE — Clinical Note
David Mom discharge to home/self care  Condition at discharge: Good  Follow-up with occupational medicine as needed  Keep the area dry and clean  Prevent from and excessive water and impact  Watch for signs of infection such as redness, swell ing, drainage  Return to emergency room with new or worsening symptoms

## 2018-12-09 NOTE — ED PROVIDER NOTES
History  Chief Complaint   Patient presents with    Finger Laceration     cut finger with utility knife while cutting wire     Patient is a 45-year-old male reported to emergency room with complaint of laceration to his left thumb after he used a utility knife to cut wire at home today  There is 1 cm laceration to the dorsal aspect of the left thumb  Patient has good range of motion good capillary refill, good pulses throughout  Minimal bleeding associated  No nail involvement  Laceration is located at the proximal aspect of the left thumb  Patient appears in no acute distress and stated he is up-to-date with tetanus shot  No paresthesia, numbness, tingling to upper lower extremities  No chest pain, palpitations, shortness of breath or dizziness  No headache, neck stiffness, vision changes  Past medical history noncontributory  Stable while in ED  Area cleaned with sterile saline, Dermabond applied  No bleeding upon re-evaluation  Supportive care advice  Prior to Admission Medications   Prescriptions Last Dose Informant Patient Reported? Taking? Carbamazepine, Antipsychotic, 300 MG CP12   Yes No   Sig: Take 1 capsule by mouth 2 (two) times a day   Ivermectin (SOOLANTRA) 1 % CREA   Yes No   Sig: Apply topically   Sulfacetamide Sodium-Sulfur 9 8-4 8 % LIQD   Yes No   carBAMazepine (CARBATROL) 300 MG 12 hr capsule   No No   Sig: TAKE 1 CAPSULE TWICE DAILY  escitalopram (LEXAPRO) 5 mg tablet   No No   Sig: TAKE 1 TABLET BY MOUTH EVERY DAY   metroNIDAZOLE (METROGEL) 1 % gel   Yes No   nitroglycerin (RECTIV) 0 4 %   No No   Sig: Insert into the rectum every 12 (twelve) hours      Facility-Administered Medications: None       Past Medical History:   Diagnosis Date    Abdominal pain     Resolved: 6/26/17    Asthma     childhood asthma     Atypical chest pain     Last assessed: 4/18/14    Carpal tunnel syndrome     Right    Last assessed: 9/8/17    Dyspepsia     Last assessed: 1/19/17  History of colon polyps     Paresthesia of both feet     Rosacea     Seizures (HCC)        Past Surgical History:   Procedure Laterality Date    DENTAL SURGERY      Last assessed: 6/5/15    RI ESOPHAGOGASTRODUODENOSCOPY TRANSORAL DIAGNOSTIC N/A 2/13/2017    Procedure: EGD AND COLONOSCOPY;  Surgeon: Gómez Gilliam MD;  Location: [facility]; Service: Gastroenterology       Family History   Problem Relation Age of Onset    Stroke Maternal Grandmother         Cerebrovascular accident    Colon cancer Maternal Grandmother      I have reviewed and agree with the history as documented  Social History   Substance Use Topics    Smoking status: Never Smoker    Smokeless tobacco: Never Used    Alcohol use Yes      Comment: occasional        Review of Systems   Constitutional: Negative for chills, fatigue and fever  HENT: Negative  Eyes: Negative  Respiratory: Negative  Cardiovascular: Negative  Gastrointestinal: Negative  Musculoskeletal: Negative  Skin: Positive for wound  Negative for color change, pallor and rash  Neurological: Negative for dizziness, weakness, numbness and headaches  Physical Exam  Physical Exam   Constitutional: He is oriented to person, place, and time  He appears well-developed and well-nourished  No distress  HENT:   Head: Normocephalic  Eyes: Pupils are equal, round, and reactive to light  EOM are normal    Neck: Normal range of motion  Cardiovascular: Normal rate and regular rhythm  Pulmonary/Chest: Breath sounds normal    Abdominal: Soft  Bowel sounds are normal    Musculoskeletal: Normal range of motion  He exhibits tenderness  He exhibits no edema  Neurological: He is alert and oriented to person, place, and time  Skin: Skin is warm  No rash noted  There is erythema  No pallor         Vital Signs  ED Triage Vitals [12/09/18 1539]   Temperature Pulse Respirations Blood Pressure SpO2   (!) 96 6 °F (35 9 °C) 83 16 158/93 97 %      Temp Source Heart Rate Source Patient Position - Orthostatic VS BP Location FiO2 (%)   Temporal Monitor Sitting Right arm --      Pain Score       4           Vitals:    12/09/18 1539   BP: 158/93   Pulse: 83   Patient Position - Orthostatic VS: Sitting       Visual Acuity      ED Medications  Medications - No data to display    Diagnostic Studies  Results Reviewed     None                 No orders to display              Procedures  Procedures       Phone Contacts  ED Phone Contact    ED Course                               MDM  Number of Diagnoses or Management Options  Laceration of left thumb: established and improving  Diagnosis management comments: Dermabond applied  Prior to that area cleaned with sterile saline and dry dressing applied  Patient tolerated procedure well  No bleeding upon re-evaluation  Supportive care advice, wound care advice  Follow up with PCP as needed  Patient Progress  Patient progress: improved    CritCare Time    Disposition  Final diagnoses:   Laceration of left thumb     Time reflects when diagnosis was documented in both MDM as applicable and the Disposition within this note     Time User Action Codes Description Comment    12/9/2018  5:34 PM [address], [address] [facility] [[id number]] Laceration of left thumb       ED Disposition     ED Disposition Condition Comment    Discharge  Ary Marjanay discharge to home/self care  Condition at discharge: Good  Follow-up with PCP as needed  Keep the area dry and clean  Prevent from and excessive water and impact  Watch for signs of infection such as redness, swelling, drainage  Re turn to emergency room with new or worsening symptoms  Follow-up Information     Follow up With Specialties Details Why Matias Sandy MD Internal Medicine   [address]  [phone]            Patient's Medications   Discharge Prescriptions    No medications on file     No discharge procedures on file      ED Provider  Electronically Signed by           Shereen Lackey PA-C  12/09/18 8637

## 2019-01-11 DIAGNOSIS — G40.909 SEIZURE DISORDER (HCC): ICD-10-CM

## 2019-01-11 RX ORDER — CARBAMAZEPINE 300 MG/1
CAPSULE, EXTENDED RELEASE ORAL
Qty: 60 CAPSULE | Refills: 2 | Status: SHIPPED | OUTPATIENT
Start: 2019-01-11 | End: 2019-04-09 | Stop reason: SDUPTHER

## 2019-03-14 ENCOUNTER — OFFICE VISIT (OUTPATIENT)
Dept: INTERNAL MEDICINE CLINIC | Facility: CLINIC | Age: 49
End: 2019-03-14
Payer: COMMERCIAL

## 2019-03-14 VITALS
RESPIRATION RATE: 16 BRPM | WEIGHT: 192.2 LBS | HEIGHT: 69 IN | BODY MASS INDEX: 28.47 KG/M2 | OXYGEN SATURATION: 95 % | DIASTOLIC BLOOD PRESSURE: 82 MMHG | HEART RATE: 76 BPM | SYSTOLIC BLOOD PRESSURE: 132 MMHG

## 2019-03-14 DIAGNOSIS — Z13.220 SCREENING, LIPID: ICD-10-CM

## 2019-03-14 DIAGNOSIS — E66.3 OVERWEIGHT (BMI 25.0-29.9): ICD-10-CM

## 2019-03-14 DIAGNOSIS — Z00.00 WELLNESS EXAMINATION: Primary | ICD-10-CM

## 2019-03-14 DIAGNOSIS — D12.6 TUBULAR ADENOMA OF COLON: ICD-10-CM

## 2019-03-14 DIAGNOSIS — F41.1 GENERALIZED ANXIETY DISORDER: ICD-10-CM

## 2019-03-14 DIAGNOSIS — G40.909 SEIZURE DISORDER (HCC): ICD-10-CM

## 2019-03-14 PROCEDURE — 99396 PREV VISIT EST AGE 40-64: CPT | Performed by: INTERNAL MEDICINE

## 2019-03-14 NOTE — PROGRESS NOTES
Assessment/Plan:    Tubular adenoma of colon  Due in March 2020    Seizure disorder (HCC)  Stable on carbamazepine    Anxiety disorder  Stable on Lexapro  He would like to stay on the same dose    Overweight (BMI 25 0-29  9)  BMI Counseling: Body mass index is 28 38 kg/m²  Discussed the patient's BMI with him  The BMI is above average  BMI counseling and education was provided to the patient  Exercise recommendations include exercising 3-5 times per week  Sinusitis vs allergies- mild symptoms, improving  May try saline irrigation       Problem List Items Addressed This Visit        Digestive    Tubular adenoma of colon     Due in March 2020            Nervous and Auditory    Seizure disorder (HCC)     Stable on carbamazepine         Relevant Orders    CBC and differential    Comprehensive metabolic panel       Other    Wellness examination - Primary    Screening, lipid    Relevant Orders    Lipid panel    Anxiety disorder     Stable on Lexapro  He would like to stay on the same dose         Relevant Orders    CBC and differential    Comprehensive metabolic panel    Overweight (BMI 25 0-29  9)     BMI Counseling: Body mass index is 28 38 kg/m²  Discussed the patient's BMI with him  The BMI is above average  BMI counseling and education was provided to the patient  Exercise recommendations include exercising 3-5 times per week  Subjective:      Patient ID: Alber Wood is a 53 y o  male      HPI  Here for wellness  Due for metabolic screening  Up to date with dental and eye exams  MSM, monogamous male partner x 2 years  He used to go to [facility] for screening but not anymore  Diet has been poor  He has not been exercising regularly    The following portions of the patient's history were reviewed and updated as appropriate: allergies, past family history, past medical history, past social history, past surgical history and problem list     Review of Systems   Constitutional: Negative for chills, fever and unexpected weight change  HENT: Positive for postnasal drip, rhinorrhea and sinus pressure  Negative for ear pain and sinus pain  3 weeks, on and off symptoms better with OTC meds   Eyes:        LASIK in the summer   Respiratory: Negative for cough, shortness of breath and wheezing  Cardiovascular: Negative for chest pain, palpitations and leg swelling  Gastrointestinal: Negative for abdominal pain, constipation, diarrhea, nausea and vomiting  Genitourinary: Negative for difficulty urinating  Dribbling occasionally since he started the Lexapro   Musculoskeletal: Negative for arthralgias and myalgias  Skin: Positive for rash (rosacea right cheek resolving)  Psychiatric/Behavioral: The patient is nervous/anxious (manageable on Lexapro)  Objective:      /82   Pulse 76   Resp 16   Ht 5' 9" (1 753 m)   Wt 87 2 kg (192 lb 3 2 oz)   SpO2 95%   BMI 28 38 kg/m²          Physical Exam   Constitutional: He is oriented to person, place, and time  He appears well-developed and well-nourished  HENT:   Head: Normocephalic and atraumatic  Right Ear: External ear normal    Left Ear: External ear normal    Mouth/Throat: Oropharynx is clear and moist    Eyes: Conjunctivae are normal    Neck: Neck supple  Cardiovascular: Normal rate, regular rhythm and normal heart sounds  No murmur heard  Pulmonary/Chest: Effort normal and breath sounds normal  No respiratory distress  He has no wheezes  He has no rales  Abdominal: Soft  Bowel sounds are normal  He exhibits no distension and no mass  There is no tenderness  There is no rebound and no guarding  Musculoskeletal: Normal range of motion  Neurological: He is alert and oriented to person, place, and time  Skin: Skin is warm and dry  Psychiatric: He has a normal mood and affect   His behavior is normal  Judgment and thought content normal

## 2019-03-14 NOTE — ASSESSMENT & PLAN NOTE
BMI Counseling: Body mass index is 28 38 kg/m²  Discussed the patient's BMI with him  The BMI is above average  BMI counseling and education was provided to the patient  Exercise recommendations include exercising 3-5 times per week

## 2019-03-22 ENCOUNTER — APPOINTMENT (OUTPATIENT)
Dept: LAB | Facility: HOSPITAL | Age: 49
End: 2019-03-22
Payer: COMMERCIAL

## 2019-03-22 DIAGNOSIS — E66.3 OVERWEIGHT (BMI 25.0-29.9): Primary | ICD-10-CM

## 2019-03-22 DIAGNOSIS — E78.2 MIXED HYPERLIPIDEMIA: ICD-10-CM

## 2019-03-22 DIAGNOSIS — G40.909 SEIZURE DISORDER (HCC): ICD-10-CM

## 2019-03-22 LAB
ALBUMIN SERPL BCP-MCNC: 3.7 G/DL (ref 3.5–5)
ALP SERPL-CCNC: 104 U/L (ref 46–116)
ALT SERPL W P-5'-P-CCNC: 43 U/L (ref 12–78)
ANION GAP SERPL CALCULATED.3IONS-SCNC: 9 MMOL/L (ref 4–13)
AST SERPL W P-5'-P-CCNC: 32 U/L (ref 5–45)
BASOPHILS # BLD MANUAL: 0 THOUSAND/UL (ref 0–0.1)
BASOPHILS NFR MAR MANUAL: 0 % (ref 0–1)
BILIRUB SERPL-MCNC: 0.23 MG/DL (ref 0.2–1)
BUN SERPL-MCNC: 16 MG/DL (ref 5–25)
CALCIUM SERPL-MCNC: 8.2 MG/DL (ref 8.3–10.1)
CHLORIDE SERPL-SCNC: 102 MMOL/L (ref 100–108)
CHOLEST SERPL-MCNC: 275 MG/DL (ref 50–200)
CO2 SERPL-SCNC: 28 MMOL/L (ref 21–32)
CREAT SERPL-MCNC: 0.95 MG/DL (ref 0.6–1.3)
EOSINOPHIL # BLD MANUAL: 0.05 THOUSAND/UL (ref 0–0.4)
EOSINOPHIL NFR BLD MANUAL: 1 % (ref 0–6)
ERYTHROCYTE [DISTWIDTH] IN BLOOD BY AUTOMATED COUNT: 12 % (ref 11.6–15.1)
GFR SERPL CREATININE-BSD FRML MDRD: 95 ML/MIN/1.73SQ M
GLUCOSE P FAST SERPL-MCNC: 90 MG/DL (ref 65–99)
HCT VFR BLD AUTO: 44.6 % (ref 36.5–49.3)
HDLC SERPL-MCNC: 57 MG/DL (ref 40–60)
HGB BLD-MCNC: 15.1 G/DL (ref 12–17)
LDLC SERPL CALC-MCNC: 168 MG/DL (ref 0–100)
LYMPHOCYTES # BLD AUTO: 1.41 THOUSAND/UL (ref 0.6–4.47)
LYMPHOCYTES # BLD AUTO: 29 % (ref 14–44)
MCH RBC QN AUTO: 31 PG (ref 26.8–34.3)
MCHC RBC AUTO-ENTMCNC: 33.9 G/DL (ref 31.4–37.4)
MCV RBC AUTO: 92 FL (ref 82–98)
METAMYELOCYTES NFR BLD MANUAL: 1 % (ref 0–1)
MONOCYTES # BLD AUTO: 0.34 THOUSAND/UL (ref 0–1.22)
MONOCYTES NFR BLD: 7 % (ref 4–12)
NEUTROPHILS # BLD MANUAL: 2.92 THOUSAND/UL (ref 1.85–7.62)
NEUTS BAND NFR BLD MANUAL: 1 % (ref 0–8)
NEUTS SEG NFR BLD AUTO: 59 % (ref 43–75)
NONHDLC SERPL-MCNC: 218 MG/DL
NRBC BLD AUTO-RTO: 0 /100 WBCS
PLATELET # BLD AUTO: 200 THOUSANDS/UL (ref 149–390)
PLATELET BLD QL SMEAR: ADEQUATE
PMV BLD AUTO: 10.4 FL (ref 8.9–12.7)
POTASSIUM SERPL-SCNC: 4.2 MMOL/L (ref 3.5–5.3)
PROT SERPL-MCNC: 7 G/DL (ref 6.4–8.2)
RBC # BLD AUTO: 4.87 MILLION/UL (ref 3.88–5.62)
SODIUM SERPL-SCNC: 139 MMOL/L (ref 136–145)
TOTAL CELLS COUNTED SPEC: 100
TRIGL SERPL-MCNC: 252 MG/DL
VARIANT LYMPHS # BLD AUTO: 2 %
WBC # BLD AUTO: 4.87 THOUSAND/UL (ref 4.31–10.16)

## 2019-03-22 PROCEDURE — 80053 COMPREHEN METABOLIC PANEL: CPT | Performed by: INTERNAL MEDICINE

## 2019-03-22 PROCEDURE — 80061 LIPID PANEL: CPT | Performed by: INTERNAL MEDICINE

## 2019-03-22 PROCEDURE — 85027 COMPLETE CBC AUTOMATED: CPT | Performed by: INTERNAL MEDICINE

## 2019-03-22 PROCEDURE — 85007 BL SMEAR W/DIFF WBC COUNT: CPT | Performed by: INTERNAL MEDICINE

## 2019-03-22 PROCEDURE — 36415 COLL VENOUS BLD VENIPUNCTURE: CPT | Performed by: INTERNAL MEDICINE

## 2019-04-09 DIAGNOSIS — G40.909 SEIZURE DISORDER (HCC): ICD-10-CM

## 2019-04-09 RX ORDER — CARBAMAZEPINE 300 MG/1
CAPSULE, EXTENDED RELEASE ORAL
Qty: 60 CAPSULE | Refills: 12 | Status: SHIPPED | OUTPATIENT
Start: 2019-04-09 | End: 2020-02-23

## 2019-11-20 DIAGNOSIS — F41.1 GENERALIZED ANXIETY DISORDER: ICD-10-CM

## 2019-11-20 RX ORDER — ESCITALOPRAM OXALATE 5 MG/1
TABLET ORAL
Qty: 90 TABLET | Refills: 3 | Status: SHIPPED | OUTPATIENT
Start: 2019-11-20 | End: 2020-12-13

## 2020-02-03 ENCOUNTER — OFFICE VISIT (OUTPATIENT)
Dept: INTERNAL MEDICINE CLINIC | Facility: CLINIC | Age: 50
End: 2020-02-03
Payer: COMMERCIAL

## 2020-02-03 VITALS
HEIGHT: 69 IN | BODY MASS INDEX: 27.55 KG/M2 | WEIGHT: 186 LBS | DIASTOLIC BLOOD PRESSURE: 78 MMHG | OXYGEN SATURATION: 98 % | SYSTOLIC BLOOD PRESSURE: 142 MMHG | HEART RATE: 72 BPM

## 2020-02-03 DIAGNOSIS — G56.01 CARPAL TUNNEL SYNDROME ON RIGHT: Primary | ICD-10-CM

## 2020-02-03 PROCEDURE — 1036F TOBACCO NON-USER: CPT | Performed by: INTERNAL MEDICINE

## 2020-02-03 PROCEDURE — 3008F BODY MASS INDEX DOCD: CPT | Performed by: INTERNAL MEDICINE

## 2020-02-03 PROCEDURE — 99213 OFFICE O/P EST LOW 20 MIN: CPT | Performed by: INTERNAL MEDICINE

## 2020-02-03 NOTE — PATIENT INSTRUCTIONS
Carpal Tunnel Syndrome   AMBULATORY CARE:   Carpal tunnel syndrome (CTS)  is a condition that causes pressure to build in the carpal tunnel  The carpal tunnel is a small area between bones and tissues in your wrist  Swelling in this area puts pressure on the median nerve  The median nerve controls muscles and feeling in the hand  Common signs and symptoms:   · Dull, sharp, or shooting pain in your hand    · Numbness, tingling, or a burning feeling in your thumb, first finger, and middle finger    · Arm pain that may extend to your shoulder    · Weakness in your hand    · Swelling in your hand    · Not being able to control how your hand moves, or you drop objects  Seek care immediately if:   · You suddenly lose feeling in your hand or fingers and you cannot move them  · Your hand suddenly changes color  Contact your healthcare provider if:   · Your symptoms get worse  · Your hand and fingers are so weak that you cannot grab, squeeze, or lift items  · You have questions or concerns about your condition or care  Treatment  may not be needed  If your symptoms continue or are severe, you may need any of the following:  · NSAIDs  may be recommended to decrease swelling and pain  NSAIDs are available without a doctor's order  Ask your healthcare provider which medicine is right for you and how much to take  Take as directed  NSAIDs can cause stomach bleeding or kidney problems if not taken correctly  If you take blood thinning medicine, always ask your healthcare provider if NSAIDs are safe for you  · Steroid injections  may help decrease pain and swelling  Steroid medicine is injected into the carpal tunnel  · Transcutaneous electric nerve stimulation  uses mild electrical impulses to help decrease your wrist pain      · Surgery  called decompression may be used to take pressure off of the median nerve in your wrist   Manage your symptoms:   · Apply ice to your wrist   Ice helps decrease swelling and pain in your wrist  Ice may also help prevent tissue damage  Use an ice pack, or put crushed ice in a plastic bag  Cover the ice pack with a towel  Place it on your wrist for 15 to 20 minutes every hour, or as directed  · Rest your hands  Let your hands rest for a short time between repetitive motions, such as typing  If you feel pain, stop what you are doing and gently massage your wrist and hand  · Get physical and occupational therapy, if directed  Physical therapists will show you ways to exercise and strengthen your wrist  Occupational therapists will show you safe ways to use your wrist while you do your usual activities  · Use a wrist splint as directed  A splint will keep your wrist straight or in a slightly bent position  A wrist splint decreases pressure on the median nerve by letting your wrist rest  You may need to wear the splint for up to 8 weeks  You may need to wear it at night  Follow up with your healthcare provider as directed:  Write down your questions so you remember to ask them during your visits  © 2017 [address] [facility] Information is for End User's use only and may not be sold, redistributed or otherwise used for commercial purposes  All illustrations and images included in CareNotes® are the copyrighted property of [employer]  or [employer]  The above information is an  only  It is not intended as medical advice for individual conditions or treatments  Talk to your doctor, nurse or pharmacist before following any medical regimen to see if it is safe and effective for you

## 2020-02-03 NOTE — PROGRESS NOTES
Assessment/Plan:  Start using a wrist splint at night  Take a course of Aleve BID x 1 week with food  Schedule EMG    Problem List Items Addressed This Visit     None      Visit Diagnoses     Carpal tunnel syndrome on right    -  Primary    Relevant Orders    EMG 1 Limb            Subjective:      Patient ID: Ole Coronado is a 51 y o  male  HPI  Since November, he has been experiencing pain in the right shoulder and pins and needles in the right wrist/hand  It improved then worsened again recently  He takes Aleve with some relief  No trauma, falls  He works on a computer a lot  No neck pain  No weakness of the arm    The following portions of the patient's history were reviewed and updated as appropriate: allergies, past family history, past medical history, past social history, past surgical history and problem list     Review of Systems   Musculoskeletal: Positive for myalgias (right shoulder)  Neurological: Positive for numbness  Objective:      /78   Pulse 72   Ht 5' 9" (1 753 m)   Wt 84 4 kg (186 lb)   SpO2 98%   BMI 27 47 kg/m²          Physical Exam   Constitutional: No distress  Musculoskeletal: Normal range of motion  He exhibits no edema, tenderness or deformity  Exam of the RUE: full ROM of the shoulder, elbow, wrist  5/5 of the UEs  -Phalens on the right  No thenar hypothenar atrophy   Skin: He is not diaphoretic

## 2020-02-22 DIAGNOSIS — G40.909 SEIZURE DISORDER (HCC): ICD-10-CM

## 2020-02-23 RX ORDER — CARBAMAZEPINE 300 MG/1
CAPSULE, EXTENDED RELEASE ORAL
Qty: 60 CAPSULE | Refills: 12 | Status: SHIPPED | OUTPATIENT
Start: 2020-02-23 | End: 2021-03-31

## 2020-03-06 ENCOUNTER — APPOINTMENT (OUTPATIENT)
Dept: LAB | Facility: HOSPITAL | Age: 50
End: 2020-03-06
Payer: COMMERCIAL

## 2020-03-06 DIAGNOSIS — E66.3 OVERWEIGHT (BMI 25.0-29.9): ICD-10-CM

## 2020-03-06 DIAGNOSIS — G40.909 SEIZURE DISORDER (HCC): ICD-10-CM

## 2020-03-06 DIAGNOSIS — E78.2 MIXED HYPERLIPIDEMIA: ICD-10-CM

## 2020-03-06 LAB
ALBUMIN SERPL BCP-MCNC: 3.8 G/DL (ref 3.5–5)
ALP SERPL-CCNC: 106 U/L (ref 46–116)
ALT SERPL W P-5'-P-CCNC: 28 U/L (ref 12–78)
ANION GAP SERPL CALCULATED.3IONS-SCNC: 8 MMOL/L (ref 4–13)
AST SERPL W P-5'-P-CCNC: 22 U/L (ref 5–45)
BASOPHILS # BLD MANUAL: 0.05 THOUSAND/UL (ref 0–0.1)
BASOPHILS NFR MAR MANUAL: 1 % (ref 0–1)
BILIRUB SERPL-MCNC: 1.11 MG/DL (ref 0.2–1)
BUN SERPL-MCNC: 13 MG/DL (ref 5–25)
CALCIUM SERPL-MCNC: 8.9 MG/DL (ref 8.3–10.1)
CHLORIDE SERPL-SCNC: 101 MMOL/L (ref 100–108)
CHOLEST SERPL-MCNC: 245 MG/DL (ref 50–200)
CO2 SERPL-SCNC: 29 MMOL/L (ref 21–32)
CREAT SERPL-MCNC: 0.91 MG/DL (ref 0.6–1.3)
EOSINOPHIL # BLD MANUAL: 0.09 THOUSAND/UL (ref 0–0.4)
EOSINOPHIL NFR BLD MANUAL: 2 % (ref 0–6)
ERYTHROCYTE [DISTWIDTH] IN BLOOD BY AUTOMATED COUNT: 12.1 % (ref 11.6–15.1)
GFR SERPL CREATININE-BSD FRML MDRD: 99 ML/MIN/1.73SQ M
GLUCOSE P FAST SERPL-MCNC: 97 MG/DL (ref 65–99)
HCT VFR BLD AUTO: 46.3 % (ref 36.5–49.3)
HDLC SERPL-MCNC: 55 MG/DL
HGB BLD-MCNC: 15.4 G/DL (ref 12–17)
LDLC SERPL CALC-MCNC: 155 MG/DL (ref 0–100)
LYMPHOCYTES # BLD AUTO: 1.22 THOUSAND/UL (ref 0.6–4.47)
LYMPHOCYTES # BLD AUTO: 27 % (ref 14–44)
MCH RBC QN AUTO: 30.5 PG (ref 26.8–34.3)
MCHC RBC AUTO-ENTMCNC: 33.3 G/DL (ref 31.4–37.4)
MCV RBC AUTO: 92 FL (ref 82–98)
MONOCYTES # BLD AUTO: 0.27 THOUSAND/UL (ref 0–1.22)
MONOCYTES NFR BLD: 6 % (ref 4–12)
NEUTROPHILS # BLD MANUAL: 2.84 THOUSAND/UL (ref 1.85–7.62)
NEUTS BAND NFR BLD MANUAL: 1 % (ref 0–8)
NEUTS SEG NFR BLD AUTO: 62 % (ref 43–75)
NONHDLC SERPL-MCNC: 190 MG/DL
NRBC BLD AUTO-RTO: 0 /100 WBCS
PLATELET # BLD AUTO: 207 THOUSANDS/UL (ref 149–390)
PLATELET BLD QL SMEAR: ADEQUATE
PMV BLD AUTO: 10.6 FL (ref 8.9–12.7)
POTASSIUM SERPL-SCNC: 4.2 MMOL/L (ref 3.5–5.3)
PROT SERPL-MCNC: 7 G/DL (ref 6.4–8.2)
RBC # BLD AUTO: 5.05 MILLION/UL (ref 3.88–5.62)
RBC MORPH BLD: NORMAL
SODIUM SERPL-SCNC: 138 MMOL/L (ref 136–145)
TOTAL CELLS COUNTED SPEC: 100
TRIGL SERPL-MCNC: 173 MG/DL
VARIANT LYMPHS # BLD AUTO: 1 %
WBC # BLD AUTO: 4.51 THOUSAND/UL (ref 4.31–10.16)

## 2020-03-06 PROCEDURE — 85027 COMPLETE CBC AUTOMATED: CPT

## 2020-03-06 PROCEDURE — 85007 BL SMEAR W/DIFF WBC COUNT: CPT

## 2020-03-06 PROCEDURE — 36415 COLL VENOUS BLD VENIPUNCTURE: CPT

## 2020-03-06 PROCEDURE — 80061 LIPID PANEL: CPT

## 2020-03-06 PROCEDURE — 80053 COMPREHEN METABOLIC PANEL: CPT

## 2020-06-02 ENCOUNTER — HOSPITAL ENCOUNTER (OUTPATIENT)
Dept: NEUROLOGY | Facility: CLINIC | Age: 50
Discharge: HOME | End: 2020-06-02
Payer: COMMERCIAL

## 2020-06-02 DIAGNOSIS — G56.01 CARPAL TUNNEL SYNDROME ON RIGHT: ICD-10-CM

## 2020-06-02 PROCEDURE — 95886 MUSC TEST DONE W/N TEST COMP: CPT | Performed by: PSYCHIATRY & NEUROLOGY

## 2020-06-02 PROCEDURE — 95910 NRV CNDJ TEST 7-8 STUDIES: CPT | Performed by: PSYCHIATRY & NEUROLOGY

## 2020-06-05 ENCOUNTER — TELEPHONE (OUTPATIENT)
Dept: INTERNAL MEDICINE CLINIC | Facility: CLINIC | Age: 50
End: 2020-06-05

## 2020-06-09 ENCOUNTER — OFFICE VISIT (OUTPATIENT)
Dept: INTERNAL MEDICINE CLINIC | Facility: CLINIC | Age: 50
End: 2020-06-09
Payer: COMMERCIAL

## 2020-06-09 VITALS
SYSTOLIC BLOOD PRESSURE: 124 MMHG | RESPIRATION RATE: 16 BRPM | TEMPERATURE: 98.5 F | DIASTOLIC BLOOD PRESSURE: 88 MMHG | WEIGHT: 184.8 LBS | HEIGHT: 69 IN | OXYGEN SATURATION: 97 % | BODY MASS INDEX: 27.37 KG/M2 | HEART RATE: 79 BPM

## 2020-06-09 DIAGNOSIS — D12.6 TUBULAR ADENOMA OF COLON: ICD-10-CM

## 2020-06-09 DIAGNOSIS — E78.2 MIXED HYPERLIPIDEMIA: ICD-10-CM

## 2020-06-09 DIAGNOSIS — Z00.00 WELLNESS EXAMINATION: Primary | ICD-10-CM

## 2020-06-09 PROBLEM — Z13.220 SCREENING, LIPID: Status: RESOLVED | Noted: 2018-03-12 | Resolved: 2020-06-09

## 2020-06-09 PROBLEM — Z11.4 ENCOUNTER FOR SCREENING FOR HIV: Status: RESOLVED | Noted: 2018-03-12 | Resolved: 2020-06-09

## 2020-06-09 PROBLEM — Z13.1 SCREENING FOR DIABETES MELLITUS: Status: RESOLVED | Noted: 2018-03-12 | Resolved: 2020-06-09

## 2020-06-09 PROBLEM — Z12.11 SCREEN FOR COLON CANCER: Status: RESOLVED | Noted: 2018-03-12 | Resolved: 2020-06-09

## 2020-06-09 PROCEDURE — 99396 PREV VISIT EST AGE 40-64: CPT | Performed by: INTERNAL MEDICINE

## 2020-06-09 PROCEDURE — 3008F BODY MASS INDEX DOCD: CPT | Performed by: INTERNAL MEDICINE

## 2020-12-13 DIAGNOSIS — F41.1 GENERALIZED ANXIETY DISORDER: ICD-10-CM

## 2020-12-13 RX ORDER — ESCITALOPRAM OXALATE 5 MG/1
TABLET ORAL
Qty: 90 TABLET | Refills: 3 | Status: SHIPPED | OUTPATIENT
Start: 2020-12-13 | End: 2021-07-21 | Stop reason: SINTOL

## 2021-03-31 DIAGNOSIS — G40.909 SEIZURE DISORDER (HCC): ICD-10-CM

## 2021-03-31 RX ORDER — CARBAMAZEPINE 300 MG/1
CAPSULE, EXTENDED RELEASE ORAL
Qty: 180 CAPSULE | Refills: 4 | Status: SHIPPED | OUTPATIENT
Start: 2021-03-31 | End: 2022-04-18

## 2021-04-05 DIAGNOSIS — Z23 ENCOUNTER FOR IMMUNIZATION: ICD-10-CM

## 2021-05-28 ENCOUNTER — APPOINTMENT (OUTPATIENT)
Dept: LAB | Facility: HOSPITAL | Age: 51
End: 2021-05-28
Payer: COMMERCIAL

## 2021-05-28 DIAGNOSIS — E78.2 MIXED HYPERLIPIDEMIA: ICD-10-CM

## 2021-05-28 LAB
ALBUMIN SERPL BCP-MCNC: 3.9 G/DL (ref 3.5–5)
ALP SERPL-CCNC: 102 U/L (ref 46–116)
ALT SERPL W P-5'-P-CCNC: 45 U/L (ref 12–78)
ANION GAP SERPL CALCULATED.3IONS-SCNC: 7 MMOL/L (ref 4–13)
AST SERPL W P-5'-P-CCNC: 26 U/L (ref 5–45)
BASOPHILS # BLD MANUAL: 0 THOUSAND/UL (ref 0–0.1)
BASOPHILS NFR MAR MANUAL: 0 % (ref 0–1)
BILIRUB SERPL-MCNC: 0.32 MG/DL (ref 0.2–1)
BUN SERPL-MCNC: 16 MG/DL (ref 5–25)
CALCIUM SERPL-MCNC: 9 MG/DL (ref 8.3–10.1)
CHLORIDE SERPL-SCNC: 105 MMOL/L (ref 100–108)
CHOLEST SERPL-MCNC: 215 MG/DL (ref 50–200)
CO2 SERPL-SCNC: 32 MMOL/L (ref 21–32)
CREAT SERPL-MCNC: 0.79 MG/DL (ref 0.6–1.3)
EOSINOPHIL # BLD MANUAL: 0 THOUSAND/UL (ref 0–0.4)
EOSINOPHIL NFR BLD MANUAL: 0 % (ref 0–6)
ERYTHROCYTE [DISTWIDTH] IN BLOOD BY AUTOMATED COUNT: 12.3 % (ref 11.6–15.1)
GFR SERPL CREATININE-BSD FRML MDRD: 105 ML/MIN/1.73SQ M
GLUCOSE P FAST SERPL-MCNC: 85 MG/DL (ref 65–99)
HCT VFR BLD AUTO: 46 % (ref 36.5–49.3)
HDLC SERPL-MCNC: 62 MG/DL
HGB BLD-MCNC: 15.5 G/DL (ref 12–17)
LDLC SERPL CALC-MCNC: 126 MG/DL (ref 0–100)
LYMPHOCYTES # BLD AUTO: 0.6 THOUSAND/UL (ref 0.6–4.47)
LYMPHOCYTES # BLD AUTO: 12 % (ref 14–44)
MCH RBC QN AUTO: 30.9 PG (ref 26.8–34.3)
MCHC RBC AUTO-ENTMCNC: 33.7 G/DL (ref 31.4–37.4)
MCV RBC AUTO: 92 FL (ref 82–98)
MONOCYTES # BLD AUTO: 0.3 THOUSAND/UL (ref 0–1.22)
MONOCYTES NFR BLD: 6 % (ref 4–12)
NEUTROPHILS # BLD MANUAL: 3.92 THOUSAND/UL (ref 1.85–7.62)
NEUTS BAND NFR BLD MANUAL: 3 % (ref 0–8)
NEUTS SEG NFR BLD AUTO: 75 % (ref 43–75)
NONHDLC SERPL-MCNC: 153 MG/DL
NRBC BLD AUTO-RTO: 0 /100 WBCS
PLATELET # BLD AUTO: 191 THOUSANDS/UL (ref 149–390)
PLATELET BLD QL SMEAR: ADEQUATE
PMV BLD AUTO: 11.1 FL (ref 8.9–12.7)
POTASSIUM SERPL-SCNC: 4.6 MMOL/L (ref 3.5–5.3)
PROT SERPL-MCNC: 7.1 G/DL (ref 6.4–8.2)
RBC # BLD AUTO: 5.01 MILLION/UL (ref 3.88–5.62)
RBC MORPH BLD: NORMAL
SODIUM SERPL-SCNC: 144 MMOL/L (ref 136–145)
TOTAL CELLS COUNTED SPEC: 100
TRIGL SERPL-MCNC: 134 MG/DL
VARIANT LYMPHS # BLD AUTO: 4 %
WBC # BLD AUTO: 5.02 THOUSAND/UL (ref 4.31–10.16)

## 2021-05-28 PROCEDURE — 85007 BL SMEAR W/DIFF WBC COUNT: CPT

## 2021-05-28 PROCEDURE — 80061 LIPID PANEL: CPT

## 2021-05-28 PROCEDURE — 85027 COMPLETE CBC AUTOMATED: CPT

## 2021-05-28 PROCEDURE — 36415 COLL VENOUS BLD VENIPUNCTURE: CPT

## 2021-05-28 PROCEDURE — 80053 COMPREHEN METABOLIC PANEL: CPT

## 2021-07-19 ENCOUNTER — OFFICE VISIT (OUTPATIENT)
Dept: INTERNAL MEDICINE CLINIC | Facility: CLINIC | Age: 51
End: 2021-07-19
Payer: COMMERCIAL

## 2021-07-19 VITALS
WEIGHT: 169.6 LBS | HEART RATE: 72 BPM | RESPIRATION RATE: 16 BRPM | DIASTOLIC BLOOD PRESSURE: 78 MMHG | OXYGEN SATURATION: 97 % | SYSTOLIC BLOOD PRESSURE: 128 MMHG | BODY MASS INDEX: 25.12 KG/M2 | HEIGHT: 69 IN

## 2021-07-19 DIAGNOSIS — F41.1 GENERALIZED ANXIETY DISORDER: ICD-10-CM

## 2021-07-19 DIAGNOSIS — Z12.5 SCREENING PSA (PROSTATE SPECIFIC ANTIGEN): ICD-10-CM

## 2021-07-19 DIAGNOSIS — G40.909 SEIZURE DISORDER (HCC): ICD-10-CM

## 2021-07-19 DIAGNOSIS — Z00.00 LABORATORY EXAM ORDERED AS PART OF ROUTINE GENERAL MEDICAL EXAMINATION: ICD-10-CM

## 2021-07-19 DIAGNOSIS — D12.6 TUBULAR ADENOMA OF COLON: ICD-10-CM

## 2021-07-19 DIAGNOSIS — Z00.00 ANNUAL PHYSICAL EXAM: Primary | ICD-10-CM

## 2021-07-19 PROBLEM — E66.3 OVERWEIGHT (BMI 25.0-29.9): Status: RESOLVED | Noted: 2019-03-14 | Resolved: 2021-07-19

## 2021-07-19 PROBLEM — K60.2 ANAL FISSURE, UNSPECIFIED: Status: RESOLVED | Noted: 2018-05-18 | Resolved: 2021-07-19

## 2021-07-19 PROCEDURE — 3008F BODY MASS INDEX DOCD: CPT | Performed by: INTERNAL MEDICINE

## 2021-07-19 PROCEDURE — 99396 PREV VISIT EST AGE 40-64: CPT | Performed by: INTERNAL MEDICINE

## 2021-07-19 PROCEDURE — 3725F SCREEN DEPRESSION PERFORMED: CPT | Performed by: INTERNAL MEDICINE

## 2021-07-19 PROCEDURE — 1036F TOBACCO NON-USER: CPT | Performed by: INTERNAL MEDICINE

## 2021-07-19 NOTE — ASSESSMENT & PLAN NOTE
He attempted to wean off the carbamazepine around 6 years ago and did not feel well  That was around the time he was experiencing anxiety which could have caused the symptoms  He is experiencing difficulty reaching an orgasm which could be from escitalopram and is interested in bupropion  However, this can lower the seizure threshold so may not be the best choice  I will reach out to our psychiatrist and see what they can recommend

## 2021-07-19 NOTE — PATIENT INSTRUCTIONS

## 2021-09-10 ENCOUNTER — TELEPHONE (OUTPATIENT)
Dept: GASTROENTEROLOGY | Facility: CLINIC | Age: 51
End: 2021-09-10

## 2021-09-24 ENCOUNTER — TELEPHONE (OUTPATIENT)
Dept: INTERNAL MEDICINE CLINIC | Facility: CLINIC | Age: 51
End: 2021-09-24

## 2021-09-24 NOTE — TELEPHONE ENCOUNTER
Attempted to initiate PA for VIIBRYD and was rejected as patient has [employer] as medication benefit  New PA initiated pending insurance response  Medication Prior Authorization     Member ID # [id number]  RX Chelsy Barrios [id number]  [employer] [id number]  [phone]    Patient advised to bring medication benefit card next visit to be scanned into chart

## 2021-10-07 ENCOUNTER — PREP FOR PROCEDURE (OUTPATIENT)
Dept: GASTROENTEROLOGY | Facility: CLINIC | Age: 51
End: 2021-10-07

## 2021-10-07 DIAGNOSIS — D12.6 TUBULAR ADENOMA OF COLON: Primary | ICD-10-CM

## 2021-10-07 NOTE — TELEPHONE ENCOUNTER
Colon scheduled on 11/30 at [facility] with Dr Tomas NEGRETE gave pt verbal instructions/mailed  Prep-Miralax/Dulcolax  Cinthya freeman'ed to schedule at 12pm

## 2021-11-17 ENCOUNTER — TELEPHONE (OUTPATIENT)
Dept: GASTROENTEROLOGY | Facility: MEDICAL CENTER | Age: 51
End: 2021-11-17

## 2021-11-29 ENCOUNTER — TELEPHONE (OUTPATIENT)
Dept: GASTROENTEROLOGY | Facility: MEDICAL CENTER | Age: 51
End: 2021-11-29

## 2021-11-30 ENCOUNTER — HOSPITAL ENCOUNTER (OUTPATIENT)
Dept: GASTROENTEROLOGY | Facility: MEDICAL CENTER | Age: 51
Setting detail: OUTPATIENT SURGERY
Discharge: HOME | End: 2021-11-30
Attending: INTERNAL MEDICINE | Admitting: INTERNAL MEDICINE
Payer: COMMERCIAL

## 2021-11-30 ENCOUNTER — ANESTHESIA EVENT (OUTPATIENT)
Dept: GASTROENTEROLOGY | Facility: MEDICAL CENTER | Age: 51
End: 2021-11-30

## 2021-11-30 ENCOUNTER — ANESTHESIA (OUTPATIENT)
Dept: GASTROENTEROLOGY | Facility: MEDICAL CENTER | Age: 51
End: 2021-11-30

## 2021-11-30 VITALS
HEIGHT: 69 IN | RESPIRATION RATE: 16 BRPM | SYSTOLIC BLOOD PRESSURE: 114 MMHG | BODY MASS INDEX: 23.7 KG/M2 | HEART RATE: 82 BPM | TEMPERATURE: 98.4 F | DIASTOLIC BLOOD PRESSURE: 78 MMHG | OXYGEN SATURATION: 98 % | WEIGHT: 160 LBS

## 2021-11-30 DIAGNOSIS — D12.6 TUBULAR ADENOMA OF COLON: ICD-10-CM

## 2021-11-30 PROCEDURE — 88305 TISSUE EXAM BY PATHOLOGIST: CPT | Performed by: PATHOLOGY

## 2021-11-30 PROCEDURE — 45385 COLONOSCOPY W/LESION REMOVAL: CPT | Performed by: INTERNAL MEDICINE

## 2021-11-30 RX ORDER — PROPOFOL 10 MG/ML
INJECTION, EMULSION INTRAVENOUS AS NEEDED
Status: DISCONTINUED | OUTPATIENT
Start: 2021-11-30 | End: 2021-11-30

## 2021-11-30 RX ORDER — SODIUM CHLORIDE 9 MG/ML
125 INJECTION, SOLUTION INTRAVENOUS CONTINUOUS
Status: DISCONTINUED | OUTPATIENT
Start: 2021-11-30 | End: 2021-12-04 | Stop reason: HOSPADM

## 2021-11-30 RX ADMIN — PROPOFOL 40 MG: 10 INJECTION, EMULSION INTRAVENOUS at 13:42

## 2021-11-30 RX ADMIN — PROPOFOL 40 MG: 10 INJECTION, EMULSION INTRAVENOUS at 13:45

## 2021-11-30 RX ADMIN — Medication 40 MG: at 13:36

## 2021-11-30 RX ADMIN — PROPOFOL 40 MG: 10 INJECTION, EMULSION INTRAVENOUS at 13:39

## 2021-11-30 RX ADMIN — PROPOFOL 120 MG: 10 INJECTION, EMULSION INTRAVENOUS at 13:33

## 2021-11-30 RX ADMIN — PROPOFOL 40 MG: 10 INJECTION, EMULSION INTRAVENOUS at 13:36

## 2021-11-30 RX ADMIN — SODIUM CHLORIDE 125 ML/HR: 0.9 INJECTION, SOLUTION INTRAVENOUS at 13:01

## 2022-03-12 ENCOUNTER — APPOINTMENT (OUTPATIENT)
Dept: LAB | Facility: HOSPITAL | Age: 52
End: 2022-03-12
Payer: COMMERCIAL

## 2022-03-15 ENCOUNTER — TELEPHONE (OUTPATIENT)
Dept: HEMATOLOGY ONCOLOGY | Facility: CLINIC | Age: 52
End: 2022-03-15

## 2022-03-15 PROBLEM — D72.819 LEUKOPENIA: Status: ACTIVE | Noted: 2022-03-15

## 2022-03-15 NOTE — TELEPHONE ENCOUNTER
New Patient Intake Form   Patient Details:    Dennys Kemp  1970  [id number]    Appointment Information   Who is calling to schedule? Patient   If not self, what is the caller's name? Please put name of RBC nurse as well  Referring provider Julieth Banda MD   What is the diagnosis? Leukopenia, unspecified type     Is there a confirmed tissue diagnosis? No   Is there a biopsy ordered or pending? Please specify dates   n/a     Is patient aware of diagnosis? Yes   Have you had any imaging or labs done? If yes, where? (If imaging done outside of [facility], please remind patient to bring a disk ) Yes     03/12     If imaging done at outside facility, did you instruct patient to obtain discs and bring to visit? n/a   Have you been seen by another Oncologist/Hematologist?  If so, who and where? no   Are the records in [facility] or Care Everywhere? yes   Does the patient have records at another facility/hospital? no   If yes, Name of facility, city and state where facility is located  Did you instruct patient to have records faxed to rightx and provide rightfax number? n/a   Preferred [place]   Is the patient willing to be seen by another provider?   (This is for breast patients only) n/a   Miscellaneous Information: appt made for 03/18

## 2022-03-15 NOTE — PROGRESS NOTES
Hematology Outpatient Office Note    Date of Service: 3/18/2022    [facility] [place]  [address]  [place] [address]  [phone]    Reason for Consultation:   Chief Complaint   Patient presents with    Follow-up       Referral Physician: Jaylene Pearl MD    Primary Care Physician:  Lendia Cheadle, MD         ASSESSMENT & PLAN      Diagnosis ICD-10-CM Associated Orders   1  Leukopenia, unspecified type  [id number] Ambulatory Referral to Hematology / Oncology         This is a 49 y o  c PMHx notable for asthma, anxiety, rosacea, seizure disorder, being seen in consultation for leukopenia       I do not suspect nutritional deficiency contributing to leukopenia as his hemogram is otherwise without any macrocytosis/microcytosis or other tell-tale signs of vitamin B12, folate, or iron deficiencies  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  The patient's leukopenia (very mild) without other absolute WBC differential deficit is new since 5/28/2021  Since that time, he started taking Truvada 6 months ago (9/2021)  5% of patients can have leukopenia/neutropenia on Truvada  He has been on viibryd since 7/2021, however, this medication has not been implicated with leukopenia  Immune mediated neutropenias: not likely in this patient  In patients with SLE, leukopenia (<2000) and neutropenia (<1800) are found in ~50% of patients  In RA around 3% of patients will have leukopenia  In Sjogren sydrome ~30% of patients will have leukopenia ([id number])  Secondary autoimmune granulocytopenia occurs in some conditions such as CLL, SLE, RA, Sjogren's, celiac disease, hyperthyroidism  LGL is a common cause of secondary AIG and would to be excluded  Most cases of AIG will resolve spontaneously over 1-3 weeks  Risk of infection is increased with [address] <500  Administration of G-CSF usually improves neutropenia with 3-5 days, however neutropenia may occur again after discontinuation  There is a report Brooks Fontanez et al 2015) of improvement in autoimmune neutropenia with IVIG treatment (0 4mg/kg x 3days), however treatment is not curative and neutropenia may recur  Treatment with prednisone is effective in ~50% of cases  · Discussion of decision making    I personally reviewed the following lab results, the image studies, pathology, other specialty/physicians consult notes and recommendations, and outside medical records from [facility]  I had a lengthy discussion with the patient and shared the work-up findings  We discussed the diagnosis and management plan as below  I spent 46 minutes reviewing the records (labs, clinician notes, outside records, medical history, ordering medicine/tests/procedures, interpreting the imaging/labs previously done) and coordination of care as well as direct time with the patient today, of which greater than 50% of the time was spent in counseling and coordination of care with the patient/family  · Plan/Labs  · Repeat CBC with diff  Depending on status of WBC at repeat, can determine need for further leukopenia w/u  No stigmata for other autoimmune conditions at this time  Peripheral smear being ordered as well with path review  · As long as his white blood cell count remains stable, there is no strong indication to discontinue Truvada  Would recommend monitoring his CBC with every 6 month levels in the meantime         Follow Up: 6 months f/u (can cancel if repeat CBC is WNL)    All questions were answered to the patient's satisfaction during this encounter  The patient knows the contact information for our office and knows to reach out for any relevant concerns related to this encounter   They are to call for any temperature 100 4 or higher, new symptoms including but not restricted to shaking chills, decreased appetite, nausea, vomiting, diarrhea, increased fatigue, shortness of breath or chest pain, confusion, and not feeling the strength to come to the clinic  For all other listed problems and medical diagnosis in their chart - they are managed by PCP and/or other specialists, which the patient acknowledges  Thank you very much for your consultation and making us a part of this patient's care  We are continuing to follow closely with you  Please do not hesitate to reach out to me with any additional questions or concerns  Teto Keen MD  Hematology & Medical Oncology Staff Physician             Disclaimer: This document was prepared using [employer] Fluency Direct technology  If a word or phrase is confusing, or does not make sense, this is likely due to recognition error which was not discovered during this clinician's review  If you believe an error has occurred, please contact me through [address] [place] line for bridger? cation  HEMATOLOGICAL HISTORY OF PRESENT ILLNESS      Clotting History None   Bleeding History None   Cancer History None   Family Cancer History mGrandmother (colon at age 80)   H/O Blood/Plt Transfusion None   Tobacco/etoh/drug abuse No abuse  Minor etoh use   Hx COVID19 Infection and Vaccine Status Vaccinated x3  1/2022 COVID-19 infection   Cancer Screening history 11/30/2021 C-scope showing 3 polyps with tubular adenoma and repeat in 3 years due   Occupation    New medications in the last month: None  Pain: None      SUBJECTIVE  (INTERVAL HISTORY)        I have reviewed the relevant past medical, surgical, social and family history  I have also reviewed allergies and medications for this patient  Review of Systems  Denies unintentional weight loss, night sweats, F/C, N/V, SOB, CP, LH, HA, rash, itching, generalized weakness, falls, dysuria, melena, hematochezia, or change in urinary frequency  A 10-point review of system was performed, pertinent positive and negative were detailed as above  Otherwise, the 10-point review of system was negative        Past Medical History:   Diagnosis Date    Abdominal pain     Resolved: 6/26/17    Anal fissure, unspecified 5/18/2018    Asthma     childhood asthma     Atypical chest pain     Last assessed: 4/18/14    Carpal tunnel syndrome     Right  Last assessed: 9/8/17    Colon polyp     Dyspepsia     Last assessed: 1/19/17    Encounter for screening for HIV 3/12/2018    Overweight (BMI 25 0-29 9) 3/14/2019    Paresthesia of both feet     Paresthesias     Rosacea     Seizures ([address] )     last seizures 10 years ago       Past Surgical History:   Procedure Laterality Date    COLONOSCOPY      DENTAL SURGERY      Last assessed: 6/5/15, dental implants    MD ESOPHAGOGASTRODUODENOSCOPY TRANSORAL DIAGNOSTIC N/A 2/13/2017    Procedure: EGD AND COLONOSCOPY;  Surgeon: Lani Brown MD;  Location: [facility];   Service: Gastroenterology       Family History   Problem Relation Age of Onset    Stroke Maternal Grandmother         Cerebrovascular accident    Colon cancer Maternal Grandmother     No Known Problems Mother     No Known Problems Father        Social History     Socioeconomic History    Marital status: Single     Spouse name: Not on file    Number of children: Not on file    Years of education: Not on file    Highest education level: Not on file   Occupational History    Not on file   Tobacco Use    Smoking status: Never Smoker    Smokeless tobacco: Never Used   Substance and Sexual Activity    Alcohol use: Yes     Comment: occasional    Drug use: No    Sexual activity: Not on file   Other Topics Concern    Not on file   Social History Narrative    Not on file     Social Determinants of Health     Financial Resource Strain: Not on file   Food Insecurity: Not on file   Transportation Needs: Not on file   Physical Activity: Not on file   Stress: Not on file   Social Connections: Not on file   Intimate Partner Violence: Not on file   Housing Stability: Not on file       No Known Allergies    Current Outpatient Medications   Medication Sig Dispense Refill    carBAMazepine (CARBATROL) 300 MG 12 hr capsule TAKE 1 CAPSULE BY MOUTH TWICE A  capsule 4    Emtricitabine-Tenofovir DF (TRUVADA PO) Take 1 tablet by mouth daily      metroNIDAZOLE (METROGEL) 1 % gel       Sulfacetamide Sodium-Sulfur 9 8-4 8 % LIQD       Viibryd 20 MG tablet TAKE 1 TABLET BY MOUTH DAILY WITH BREAKFAST 30 tablet 5    Carbamazepine, Antipsychotic, 300 MG CP12 Take 1 capsule by mouth 2 (two) times a day      Ivermectin (SOOLANTRA) 1 % CREA Apply topically       No current facility-administered medications for this visit  (Not in a hospital admission)        Objective:     24 Hour Vitals Assessment:     Vitals:    03/18/22 0946   BP: 122/80   Pulse: 86   Resp: 18   Temp: (!) 96 9 °F (36 1 °C)   SpO2: 98%       PHYSICIAN EXAM:    General: Appearance: alert, cooperative, no distress  HEENT: Normocephalic, atraumatic  No scleral icterus  conjunctivae clear  EOMI  Chest: No tenderness to palpation  No open wound noted  Lungs: Clear to auscultation bilaterally, Respirations unlabored  Cardiac: Regular rate and rhythm, +S1and S2  Abdomen: Soft, non-tender, non-distended  Bowel sounds are normal     Extremities:  No edema, cyanosis, clubbing  Skin: Skin color, turgor are normal  No rashes  Neurologic: Awake, Alert, and oriented, no gross focal deficits noted b/l  DATA REVIEW:    Pathology Result:    Final Diagnosis   Date Value Ref Range Status   11/30/2021   Final    A  Colon, cecal polyp, biopsy:  -  Sessile serrated adenoma  B  Colon, transverse polyps, biopsies:  -  Portions of tubular adenomas, negative for high-grade dysplasia        02/13/2017   Final    A  Duodenum, biopsy:  -  Benign duodenal mucosa  -  No villous atrophy, intraepithelial lymphocytosis or crypt hyperplasia; negative for features of malabsorptive enteropathy   -  Negative for chronic or active duodenitis, dysplasia or malignancy      B   Stomach, biopsy:  -  Chronic inactive oxyntic and antral gastritis  -  Negative for Helicobacter pylori, confirmed by immunohistochemical stain, evaluated with appropriate positive controls  -  Negative for atrophy, intestinal metaplasia, dysplasia or carcinoma  -  Alcian blue/PAS stain is negative for intestinal type mucin  C   Colon, transverse polyp, biopsy:  -  Tubular adenoma, negative for high-grade dysplasia  Image Results:   Image result are reviewed and documented in Hematology/Oncology history  I personally reviewed these images  Colonoscopy  Narrative: [id number]  [address]  [phone]    DATE OF SERVICE:  11/30/21    PHYSICIAN(S):  Noé Estrada MD - Attending Physician    INDICATION:  Tubular adenoma of colon  Colonoscopy performed for a diagnostic indication  POST-OP DIAGNOSIS:  See the impression below  HISTORY:  Prior colonoscopy: 4 years ago  BOWEL PREPARATION:  Miralax/Dulcolax    PREPROCEDURE:  Informed consent was obtained for the procedure, including sedation  Risks   including but not limited to bleeding, infection, perforation, adverse   drug reaction and aspiration were explained in detail  Also explained   about less than 100% sensitivity with the exam and other alternatives  The   patient was placed in the left lateral decubitus position  DETAILS OF PROCEDURE:  Patient was taken to the procedure room where a time out was performed to   confirm correct patient and correct procedure  The patient underwent   monitored anesthesia care, which was administered by an anesthesia   professional  The patient's blood pressure, heart rate, level of   consciousness, oxygen and respirations were monitored throughout the   procedure  A digital rectal exam was performed  The scope was introduced   through the anus and advanced to the cecum  Retroflexion was performed in   the rectum   The quality of bowel preparation was evaluated using the   Henry Bowel Preparation Scale with scores of: right colon = 2, transverse   colon = 2, left colon = 2  The total BBPS score was 6  Bowel prep was   adequate  The patient experienced no blood loss  The procedure was not   difficult  The patient tolerated the procedure well  There were no   apparent complications  ANESTHESIA INFORMATION:  ASA: II  Anesthesia Type: IV Sedation with Anesthesia    MEDICATIONS:  sodium chloride 0 9 % infusion 700 mL*    *From user-documented volume   simethicone (MYLICON) 40 mg in sterile water 60 mL 40 mg   (Totals for administrations occurring from 1331 to 1352 on 11/30/21)     FINDINGS:  Three polyps measuring from 2 mm up to 10 mm in the cecum and transverse   colon; completely removed en bloc by cold snare and retrieved specimen  All observed locations appeared normal, including the ileocecal valve,   cecum, ascending colon, hepatic flexure, transverse colon, splenic   flexure, descending colon, sigmoid colon, rectosigmoid and rectum  EVENTS:  Procedure Events   Event Event Time   ENDO CECUM REACHED 11/30/2021  1:36 PM   ENDO SCOPE OUT TIME 11/30/2021  1:50 PM     SPECIMENS:  ID Type Source Tests Collected by Time Destination   1 : Cecum polyp-cold snare Tissue Polyp, Colorectal TISSUE EXAM Justo Marquez MD 11/30/2021  1:40 PM    2 : Transverse colon polyp-cold snare x2 Tissue Polyp, Colorectal TISSUE   EXAM Riccardo Whyte MD 11/30/2021  1:43 PM      EQUIPMENT:  Colonoscope -   Chirply VISION MED BLUE ID [id number]  Impression: Three polyps measuring from 2 mm up to 10 mm in the cecum and transverse   colon; completely removed en bloc by cold snare and retrieved specimen  All observed locations appeared normal, including the ileocecal valve,   cecum, ascending colon, hepatic flexure, transverse colon, splenic   flexure, descending colon, sigmoid colon, rectosigmoid and rectum  RECOMMENDATION:  Repeat colonoscopy in 3 years due to a personal history of colon polyps      Riccardo Whyte, MD      LABS:  Lab data are reviewed and documented in HemOnc history  Lab Results   Component Value Date    HGB 15 7 03/12/2022    HCT 44 6 03/12/2022    MCV 89 03/12/2022     03/12/2022    WBC 4 01 (L) 03/12/2022    NRBC 0 03/12/2022    BANDSPCT 3 05/28/2021    ATYLMPCT 4 (H) 05/28/2021     Lab Results   Component Value Date     07/27/2015    K 4 6 05/28/2021     05/28/2021    CO2 32 05/28/2021    ANIONGAP 5 07/27/2015    BUN 16 05/28/2021    CREATININE 0 79 05/28/2021    GLUCOSE 102 07/27/2015    GLUF 85 05/28/2021    CALCIUM 9 0 05/28/2021    AST 26 05/28/2021    ALT 45 05/28/2021    ALKPHOS 102 05/28/2021    PROT 6 6 07/27/2015    BILITOT 0 25 07/27/2015    EGFR 105 05/28/2021       No results found for: IRON, TIBC, FERRITIN    No results found for: [id number]    No results for input(s): WBC, CREAT in the last 72 hours      Invalid input(s):  PLT     By:  Shahrzad Shelley MD, 3/18/2022, 10:08 AM

## 2022-03-16 ENCOUNTER — DOCUMENTATION (OUTPATIENT)
Dept: INTERNAL MEDICINE CLINIC | Facility: CLINIC | Age: 52
End: 2022-03-16

## 2022-03-16 NOTE — PROGRESS NOTES
Spoke with patient who needs prior auth for Viibryd 20 mg patient was on Lexapro but was changed to the [address]  Patient states reason for change was due to, too many sexual side effects of the Lexapro  Prior auth form filled out via [facility] and sent to plan  Waiting for determination

## 2022-03-17 ENCOUNTER — PATIENT MESSAGE (OUTPATIENT)
Dept: INTERNAL MEDICINE CLINIC | Facility: CLINIC | Age: 52
End: 2022-03-17

## 2022-03-18 ENCOUNTER — CONSULT (OUTPATIENT)
Dept: HEMATOLOGY ONCOLOGY | Facility: CLINIC | Age: 52
End: 2022-03-18
Payer: COMMERCIAL

## 2022-03-18 VITALS
BODY MASS INDEX: 23.55 KG/M2 | SYSTOLIC BLOOD PRESSURE: 122 MMHG | OXYGEN SATURATION: 98 % | HEIGHT: 69 IN | WEIGHT: 159 LBS | RESPIRATION RATE: 18 BRPM | TEMPERATURE: 96.9 F | HEART RATE: 86 BPM | DIASTOLIC BLOOD PRESSURE: 80 MMHG

## 2022-03-18 DIAGNOSIS — D72.819 LEUKOPENIA, UNSPECIFIED TYPE: Primary | ICD-10-CM

## 2022-03-18 PROCEDURE — 99204 OFFICE O/P NEW MOD 45 MIN: CPT | Performed by: INTERNAL MEDICINE

## 2022-03-18 NOTE — PATIENT INSTRUCTIONS
Patient information:  Blood is made up of white blood cells, red blood cells, and platelets  There are many types of white blood cells, but most white blood cells are neutrophils  These cells circulate in the blood and look for possible infections, like bacteria  These cells only last about 8 hours before they are cleared from the body, so they are continually being made in the bone marrow  Small changes in the survival of neutrophils, production of neutrophils, or how well the neutrophils get into the tissues where they do their job can change the number of these cells we see in the blood  The second most common white blood cell is the lymphocyte, which is also an immune system cell  Common reasons to find lower numbers in the blood include problems with making the cells, increased clearance of the cells, or even that the cells simply spend less time in the blood with more time in the tissues doing their jobs

## 2022-03-24 ENCOUNTER — APPOINTMENT (OUTPATIENT)
Dept: LAB | Facility: HOSPITAL | Age: 52
End: 2022-03-24
Attending: INTERNAL MEDICINE
Payer: COMMERCIAL

## 2022-03-24 DIAGNOSIS — D72.819 LEUKOPENIA, UNSPECIFIED TYPE: ICD-10-CM

## 2022-03-24 LAB
BASOPHILS # BLD MANUAL: 0.04 THOUSAND/UL (ref 0–0.1)
BASOPHILS NFR MAR MANUAL: 1 % (ref 0–1)
EOSINOPHIL # BLD MANUAL: 0.2 THOUSAND/UL (ref 0–0.4)
EOSINOPHIL NFR BLD MANUAL: 5 % (ref 0–6)
ERYTHROCYTE [DISTWIDTH] IN BLOOD BY AUTOMATED COUNT: 12 % (ref 11.6–15.1)
HCT VFR BLD AUTO: 43.8 % (ref 36.5–49.3)
HGB BLD-MCNC: 15.7 G/DL (ref 12–17)
LYMPHOCYTES # BLD AUTO: 1.06 THOUSAND/UL (ref 0.6–4.47)
LYMPHOCYTES # BLD AUTO: 27 % (ref 14–44)
MCH RBC QN AUTO: 32 PG (ref 26.8–34.3)
MCHC RBC AUTO-ENTMCNC: 35.8 G/DL (ref 31.4–37.4)
MCV RBC AUTO: 89 FL (ref 82–98)
MONOCYTES # BLD AUTO: 0.2 THOUSAND/UL (ref 0–1.22)
MONOCYTES NFR BLD: 5 % (ref 4–12)
NEUTROPHILS # BLD MANUAL: 2.36 THOUSAND/UL (ref 1.85–7.62)
NEUTS SEG NFR BLD AUTO: 60 % (ref 43–75)
PLATELET # BLD AUTO: 192 THOUSANDS/UL (ref 149–390)
PLATELET BLD QL SMEAR: ADEQUATE
PMV BLD AUTO: 10.6 FL (ref 8.9–12.7)
RBC # BLD AUTO: 4.9 MILLION/UL (ref 3.88–5.62)
RBC MORPH BLD: NORMAL
VARIANT LYMPHS # BLD AUTO: 2 %
WBC # BLD AUTO: 3.93 THOUSAND/UL (ref 4.31–10.16)

## 2022-03-24 PROCEDURE — 85027 COMPLETE CBC AUTOMATED: CPT

## 2022-03-24 PROCEDURE — 85007 BL SMEAR W/DIFF WBC COUNT: CPT

## 2022-03-24 PROCEDURE — 36415 COLL VENOUS BLD VENIPUNCTURE: CPT

## 2022-04-15 DIAGNOSIS — G40.909 SEIZURE DISORDER (HCC): ICD-10-CM

## 2022-04-18 RX ORDER — CARBAMAZEPINE 300 MG/1
CAPSULE, EXTENDED RELEASE ORAL
Qty: 180 CAPSULE | Refills: 4 | Status: SHIPPED | OUTPATIENT
Start: 2022-04-18

## 2022-07-07 ENCOUNTER — APPOINTMENT (OUTPATIENT)
Dept: LAB | Facility: HOSPITAL | Age: 52
End: 2022-07-07
Payer: COMMERCIAL

## 2022-07-07 DIAGNOSIS — Z00.00 LABORATORY EXAM ORDERED AS PART OF ROUTINE GENERAL MEDICAL EXAMINATION: ICD-10-CM

## 2022-07-07 DIAGNOSIS — Z12.5 SCREENING PSA (PROSTATE SPECIFIC ANTIGEN): ICD-10-CM

## 2022-07-07 LAB
ALBUMIN SERPL BCP-MCNC: 3.6 G/DL (ref 3.5–5)
ALP SERPL-CCNC: 96 U/L (ref 46–116)
ALT SERPL W P-5'-P-CCNC: 33 U/L (ref 12–78)
ANION GAP SERPL CALCULATED.3IONS-SCNC: 7 MMOL/L (ref 4–13)
AST SERPL W P-5'-P-CCNC: 20 U/L (ref 5–45)
BASOPHILS # BLD MANUAL: 0.04 THOUSAND/UL (ref 0–0.1)
BASOPHILS NFR MAR MANUAL: 1 % (ref 0–1)
BILIRUB SERPL-MCNC: 0.4 MG/DL (ref 0.2–1)
BUN SERPL-MCNC: 12 MG/DL (ref 5–25)
CALCIUM SERPL-MCNC: 8.5 MG/DL (ref 8.3–10.1)
CHLORIDE SERPL-SCNC: 102 MMOL/L (ref 100–108)
CHOLEST SERPL-MCNC: 199 MG/DL
CO2 SERPL-SCNC: 31 MMOL/L (ref 21–32)
CREAT SERPL-MCNC: 1 MG/DL (ref 0.6–1.3)
EOSINOPHIL # BLD MANUAL: 0.13 THOUSAND/UL (ref 0–0.4)
EOSINOPHIL NFR BLD MANUAL: 3 % (ref 0–6)
ERYTHROCYTE [DISTWIDTH] IN BLOOD BY AUTOMATED COUNT: 12.1 % (ref 11.6–15.1)
GFR SERPL CREATININE-BSD FRML MDRD: 86 ML/MIN/1.73SQ M
HCT VFR BLD AUTO: 44.6 % (ref 36.5–49.3)
HDLC SERPL-MCNC: 64 MG/DL
HGB BLD-MCNC: 15.8 G/DL (ref 12–17)
LDLC SERPL CALC-MCNC: 107 MG/DL (ref 0–100)
LYMPHOCYTES # BLD AUTO: 0.84 THOUSAND/UL (ref 0.6–4.47)
LYMPHOCYTES # BLD AUTO: 19 % (ref 14–44)
MCH RBC QN AUTO: 32.8 PG (ref 26.8–34.3)
MCHC RBC AUTO-ENTMCNC: 35.4 G/DL (ref 31.4–37.4)
MCV RBC AUTO: 93 FL (ref 82–98)
METAMYELOCYTES NFR BLD MANUAL: 5 % (ref 0–1)
MONOCYTES # BLD AUTO: 0.26 THOUSAND/UL (ref 0–1.22)
MONOCYTES NFR BLD: 6 % (ref 4–12)
MYELOCYTES NFR BLD MANUAL: 1 % (ref 0–1)
NEUTROPHILS # BLD MANUAL: 2.87 THOUSAND/UL (ref 1.85–7.62)
NEUTS BAND NFR BLD MANUAL: 1 % (ref 0–8)
NEUTS SEG NFR BLD AUTO: 64 % (ref 43–75)
PLATELET # BLD AUTO: 185 THOUSANDS/UL (ref 149–390)
PLATELET BLD QL SMEAR: ADEQUATE
PMV BLD AUTO: 10.6 FL (ref 8.9–12.7)
POTASSIUM SERPL-SCNC: 4.1 MMOL/L (ref 3.5–5.3)
PROT SERPL-MCNC: 6.9 G/DL (ref 6.4–8.2)
PSA SERPL-MCNC: 0.4 NG/ML (ref 0–4)
RBC # BLD AUTO: 4.82 MILLION/UL (ref 3.88–5.62)
RBC MORPH BLD: NORMAL
SODIUM SERPL-SCNC: 140 MMOL/L (ref 136–145)
TRIGL SERPL-MCNC: 142 MG/DL
WBC # BLD AUTO: 4.41 THOUSAND/UL (ref 4.31–10.16)

## 2022-07-07 PROCEDURE — 85027 COMPLETE CBC AUTOMATED: CPT

## 2022-07-07 PROCEDURE — 80053 COMPREHEN METABOLIC PANEL: CPT

## 2022-07-07 PROCEDURE — G0103 PSA SCREENING: HCPCS

## 2022-07-07 PROCEDURE — 85007 BL SMEAR W/DIFF WBC COUNT: CPT

## 2022-07-07 PROCEDURE — 85025 COMPLETE CBC W/AUTO DIFF WBC: CPT

## 2022-07-07 PROCEDURE — 80061 LIPID PANEL: CPT

## 2022-07-07 PROCEDURE — 36415 COLL VENOUS BLD VENIPUNCTURE: CPT

## 2022-07-12 DIAGNOSIS — F41.1 GENERALIZED ANXIETY DISORDER: ICD-10-CM

## 2022-07-12 RX ORDER — VILAZODONE HYDROCHLORIDE 20 MG/1
TABLET ORAL
Qty: 30 TABLET | Refills: 5 | Status: SHIPPED | OUTPATIENT
Start: 2022-07-12

## 2022-07-21 ENCOUNTER — OFFICE VISIT (OUTPATIENT)
Dept: INTERNAL MEDICINE CLINIC | Facility: CLINIC | Age: 52
End: 2022-07-21
Payer: COMMERCIAL

## 2022-07-21 VITALS
DIASTOLIC BLOOD PRESSURE: 88 MMHG | SYSTOLIC BLOOD PRESSURE: 142 MMHG | HEIGHT: 69 IN | BODY MASS INDEX: 24.71 KG/M2 | WEIGHT: 166.8 LBS | HEART RATE: 67 BPM | OXYGEN SATURATION: 97 %

## 2022-07-21 DIAGNOSIS — Z00.00 ANNUAL PHYSICAL EXAM: Primary | ICD-10-CM

## 2022-07-21 DIAGNOSIS — Z12.5 SCREENING PSA (PROSTATE SPECIFIC ANTIGEN): ICD-10-CM

## 2022-07-21 DIAGNOSIS — L30.9 ECZEMA, UNSPECIFIED TYPE: ICD-10-CM

## 2022-07-21 DIAGNOSIS — Z00.00 LABORATORY EXAM ORDERED AS PART OF ROUTINE GENERAL MEDICAL EXAMINATION: ICD-10-CM

## 2022-07-21 DIAGNOSIS — G40.909 SEIZURE DISORDER (HCC): ICD-10-CM

## 2022-07-21 DIAGNOSIS — D72.819 LEUKOPENIA, UNSPECIFIED TYPE: ICD-10-CM

## 2022-07-21 PROCEDURE — 99396 PREV VISIT EST AGE 40-64: CPT | Performed by: INTERNAL MEDICINE

## 2022-07-21 PROCEDURE — 3725F SCREEN DEPRESSION PERFORMED: CPT | Performed by: INTERNAL MEDICINE

## 2022-07-21 RX ORDER — TRIAMCINOLONE ACETONIDE 1 MG/G
CREAM TOPICAL 2 TIMES DAILY
Qty: 15 G | Refills: 0 | Status: SHIPPED | OUTPATIENT
Start: 2022-07-21

## 2022-07-21 NOTE — PROGRESS NOTES
G[place]    NAME: Rachael Kaufman  AGE: 47 y o  SEX: male  : 1970     DATE: 2022     Assessment and Plan:     Problem List Items Addressed This Visit        Nervous and Auditory    Seizure disorder (HCC)     Stable on carbamazepine            Other    Leukopenia     Normal WBC this time  Possibly from [place]  Will continue to monitor         Relevant Orders    CBC and differential      Other Visit Diagnoses     Annual physical exam    -  Primary    Eczema, unspecified type        Sees derm twice a year; will try triamcinolone    Relevant Medications    triamcinolone (KENALOG) 0 1 % cream    Laboratory exam ordered as part of routine general medical examination        Relevant Orders    CBC and differential    Comprehensive metabolic panel    Lipid Panel with Direct LDL reflex    Screening PSA (prostate specific antigen)        Relevant Orders    PSA, Total Screen          Immunizations and preventive care screenings were discussed with patient today  Appropriate education was printed on patient's after visit summary  Counseling:  · Exercise: the importance of regular exercise/physical activity was discussed  Recommend exercise 3-5 times per week for at least 30 minutes  BP elevated today  Advised to start checking at home goal 120/80       Return in about 1 year (around 2023)  Chief Complaint:     Chief Complaint   Patient presents with    Physical Exam      History of Present Illness:     Adult Annual Physical   Patient here for a comprehensive physical exam  The patient reports no problems  Diet and Physical Activity  · Diet/Nutrition: well balanced diet  · Exercise: biking weather permitting        Depression Screening  PHQ-2/9 Depression Screening    Little interest or pleasure in doing things: 1 - several days  Feeling down, depressed, or hopeless: 1 - several days  PHQ-2 Score: 2  PHQ-2 Interpretation: Negative depression screen       General Health  · Sleep: sleeps well  · Hearing: normal - bilateral   · Vision: most recent eye exam >1 year ago  · Dental: regular dental visits   Health  · Symptoms include: none     Review of Systems:     Review of Systems   Constitutional: Negative for fatigue, fever and unexpected weight change  HENT: Negative for hearing loss  Respiratory: Negative for cough and shortness of breath  Cardiovascular: Negative for chest pain, palpitations and leg swelling  Gastrointestinal: Negative for abdominal pain, constipation, diarrhea, nausea and vomiting  Genitourinary: Negative for difficulty urinating  Musculoskeletal: Negative for arthralgias and myalgias  Skin:        Spot on the chest wall for a few weeks  Using OTC cortisone   Neurological: Negative for dizziness and headaches  Past Medical History:     Past Medical History:   Diagnosis Date    Abdominal pain     Resolved: 6/26/17    Anal fissure, unspecified 5/18/2018    Asthma     childhood asthma     Atypical chest pain     Last assessed: 4/18/14    Carpal tunnel syndrome     Right  Last assessed: 9/8/17    Colon polyp     Dyspepsia     Last assessed: 1/19/17    Encounter for screening for HIV 3/12/2018    Overweight (BMI 25 0-29 9) 3/14/2019    Paresthesia of both feet     Paresthesias     Rosacea     Seizures ([address] )     last seizures 10 years ago      Past Surgical History:     Past Surgical History:   Procedure Laterality Date    COLONOSCOPY      DENTAL SURGERY      Last assessed: 6/5/15, dental implants    [place] ESOPHAGOGASTRODUODENOSCOPY TRANSORAL DIAGNOSTIC N/A 2/13/2017    Procedure: EGD AND COLONOSCOPY;  Surgeon: Justo Marquez MD;  Location: [facility];   Service: Gastroenterology      Family History:     Family History   Problem Relation Age of Onset    Stroke Maternal Grandmother         Cerebrovascular accident    Colon cancer Maternal Grandmother     No Known Problems Mother     No Known Problems Father       Social History:     Social History     Socioeconomic History    Marital status: Single     Spouse name: None    Number of children: None    Years of education: None    Highest education level: None   Occupational History    None   Tobacco Use    Smoking status: Never Smoker    Smokeless tobacco: Never Used   Vaping Use    Vaping Use: Never used   Substance and Sexual Activity    Alcohol use: Yes     Comment: 3 drinks per month -  shots    Drug use: No    Sexual activity: Yes   Other Topics Concern    None   Social History Narrative    None     Social Determinants of Health     Financial Resource Strain: Not on file   Food Insecurity: Not on file   Transportation Needs: Not on file   Physical Activity: Not on file   Stress: Not on file   Social Connections: Not on file   Intimate Partner Violence: Not on file   Housing Stability: Not on file      Current Medications:     Current Outpatient Medications   Medication Sig Dispense Refill    carBAMazepine (CARBATROL) 300 MG 12 hr capsule TAKE 1 CAPSULE BY MOUTH TWICE A  capsule 4    Emtricitabine-Tenofovir DF (TRUVADA PO) Take 1 tablet by mouth daily      metroNIDAZOLE (METROGEL) 1 % gel       Sulfacetamide Sodium-Sulfur 9 8-4 8 % LIQD       triamcinolone (KENALOG) 0 1 % cream Apply topically 2 (two) times a day 15 g 0    Viibryd 20 MG tablet TAKE 1 TABLET BY MOUTH EVERY DAY WITH BREAKFAST 30 tablet 5     No current facility-administered medications for this visit  Allergies:     No Known Allergies   Physical Exam:     /88   Pulse 67   Ht 5' 9" (1 753 m)   Wt 75 7 kg (166 lb 12 8 oz)   SpO2 97%   BMI 24 63 kg/m²     Physical Exam  Vitals and nursing note reviewed  Constitutional:       General: He is not in acute distress  Appearance: He is well-developed  He is not ill-appearing, toxic-appearing or diaphoretic  HENT:      Head: Normocephalic and atraumatic  Right Ear: External ear normal  There is no impacted cerumen  Left Ear: External ear normal  There is no impacted cerumen  Eyes:      Conjunctiva/sclera: Conjunctivae normal    Cardiovascular:      Rate and Rhythm: Normal rate and regular rhythm  Heart sounds: No murmur heard  Pulmonary:      Effort: Pulmonary effort is normal  No respiratory distress  Breath sounds: Normal breath sounds  Abdominal:      Palpations: Abdomen is soft  Tenderness: There is no abdominal tenderness  Musculoskeletal:      Cervical back: Neck supple  Skin:     General: Skin is warm and dry  Comments: Pinkish slightly raised patch on the upper chest wall   Neurological:      Mental Status: He is alert            MD Yony Morales [id number]

## 2022-07-21 NOTE — PATIENT INSTRUCTIONS

## 2023-01-14 DIAGNOSIS — F41.1 GENERALIZED ANXIETY DISORDER: ICD-10-CM

## 2023-01-14 RX ORDER — VILAZODONE HYDROCHLORIDE 20 MG/1
TABLET ORAL
Qty: 30 TABLET | Refills: 5 | Status: SHIPPED | OUTPATIENT
Start: 2023-01-14

## 2023-07-12 DIAGNOSIS — G40.909 SEIZURE DISORDER (HCC): ICD-10-CM

## 2023-07-12 RX ORDER — CARBAMAZEPINE 300 MG/1
CAPSULE, EXTENDED RELEASE ORAL
Qty: 180 CAPSULE | Refills: 3 | Status: SHIPPED | OUTPATIENT
Start: 2023-07-12

## 2023-07-20 RX ORDER — ESCITALOPRAM OXALATE 20 MG/1
20 TABLET ORAL DAILY
COMMUNITY
Start: 2023-06-16

## 2023-07-24 ENCOUNTER — TELEPHONE (OUTPATIENT)
Dept: OTHER | Facility: OTHER | Age: 53
End: 2023-07-24

## 2023-07-24 ENCOUNTER — APPOINTMENT (OUTPATIENT)
Dept: LAB | Facility: HOSPITAL | Age: 53
End: 2023-07-24
Payer: COMMERCIAL

## 2023-07-24 DIAGNOSIS — Z00.00 LABORATORY EXAM ORDERED AS PART OF ROUTINE GENERAL MEDICAL EXAMINATION: ICD-10-CM

## 2023-07-24 DIAGNOSIS — Z12.5 SCREENING PSA (PROSTATE SPECIFIC ANTIGEN): Primary | ICD-10-CM

## 2023-07-24 DIAGNOSIS — Z12.5 SCREENING PSA (PROSTATE SPECIFIC ANTIGEN): ICD-10-CM

## 2023-07-24 LAB
ALBUMIN SERPL BCP-MCNC: 4.2 G/DL (ref 3.5–5)
ALP SERPL-CCNC: 99 U/L (ref 34–104)
ALT SERPL W P-5'-P-CCNC: 19 U/L (ref 7–52)
ANION GAP SERPL CALCULATED.3IONS-SCNC: 5 MMOL/L
AST SERPL W P-5'-P-CCNC: 24 U/L (ref 13–39)
BASOPHILS # BLD MANUAL: 0 THOUSAND/UL (ref 0–0.1)
BASOPHILS NFR MAR MANUAL: 0 % (ref 0–1)
BILIRUB SERPL-MCNC: 0.43 MG/DL (ref 0.2–1)
BUN SERPL-MCNC: 17 MG/DL (ref 5–25)
CALCIUM SERPL-MCNC: 8.6 MG/DL (ref 8.4–10.2)
CHLORIDE SERPL-SCNC: 102 MMOL/L (ref 96–108)
CHOLEST SERPL-MCNC: 187 MG/DL
CO2 SERPL-SCNC: 29 MMOL/L (ref 21–32)
CREAT SERPL-MCNC: 1.01 MG/DL (ref 0.6–1.3)
EOSINOPHIL # BLD MANUAL: 0.11 THOUSAND/UL (ref 0–0.4)
EOSINOPHIL NFR BLD MANUAL: 2 % (ref 0–6)
ERYTHROCYTE [DISTWIDTH] IN BLOOD BY AUTOMATED COUNT: 12.3 % (ref 11.6–15.1)
GFR SERPL CREATININE-BSD FRML MDRD: 85 ML/MIN/1.73SQ M
GLUCOSE P FAST SERPL-MCNC: 85 MG/DL (ref 65–99)
HCT VFR BLD AUTO: 43.8 % (ref 36.5–49.3)
HDLC SERPL-MCNC: 67 MG/DL
HGB BLD-MCNC: 14.9 G/DL (ref 12–17)
LDLC SERPL CALC-MCNC: 93 MG/DL (ref 0–100)
LYMPHOCYTES # BLD AUTO: 1.13 THOUSAND/UL (ref 0.6–4.47)
LYMPHOCYTES # BLD AUTO: 21 % (ref 14–44)
MCH RBC QN AUTO: 32 PG (ref 26.8–34.3)
MCHC RBC AUTO-ENTMCNC: 34 G/DL (ref 31.4–37.4)
MCV RBC AUTO: 94 FL (ref 82–98)
MONOCYTES # BLD AUTO: 0.32 THOUSAND/UL (ref 0–1.22)
MONOCYTES NFR BLD: 6 % (ref 4–12)
MYELOCYTES NFR BLD MANUAL: 2 % (ref 0–1)
NEUTROPHILS # BLD MANUAL: 3.73 THOUSAND/UL (ref 1.85–7.62)
NEUTS SEG NFR BLD AUTO: 69 % (ref 43–75)
PLATELET # BLD AUTO: 199 THOUSANDS/UL (ref 149–390)
PLATELET BLD QL SMEAR: ADEQUATE
PMV BLD AUTO: 10.1 FL (ref 8.9–12.7)
POTASSIUM SERPL-SCNC: 4 MMOL/L (ref 3.5–5.3)
PROT SERPL-MCNC: 6.4 G/DL (ref 6.4–8.4)
PSA SERPL-MCNC: 0.26 NG/ML (ref 0–4)
RBC # BLD AUTO: 4.66 MILLION/UL (ref 3.88–5.62)
RBC MORPH BLD: NORMAL
SODIUM SERPL-SCNC: 136 MMOL/L (ref 135–147)
TRIGL SERPL-MCNC: 133 MG/DL
WBC # BLD AUTO: 5.4 THOUSAND/UL (ref 4.31–10.16)

## 2023-07-24 PROCEDURE — 80053 COMPREHEN METABOLIC PANEL: CPT

## 2023-07-24 PROCEDURE — 80061 LIPID PANEL: CPT

## 2023-07-24 PROCEDURE — 36415 COLL VENOUS BLD VENIPUNCTURE: CPT

## 2023-07-24 PROCEDURE — G0103 PSA SCREENING: HCPCS

## 2023-07-24 PROCEDURE — 85007 BL SMEAR W/DIFF WBC COUNT: CPT

## 2023-07-24 PROCEDURE — 85027 COMPLETE CBC AUTOMATED: CPT

## 2023-07-24 NOTE — TELEPHONE ENCOUNTER
Per patient, his lab orders have . He is requesting new orders in order to have labs done prior to his upcoming appt.

## 2023-07-25 ENCOUNTER — OFFICE VISIT (OUTPATIENT)
Dept: INTERNAL MEDICINE CLINIC | Facility: CLINIC | Age: 53
End: 2023-07-25
Payer: COMMERCIAL

## 2023-07-25 VITALS
BODY MASS INDEX: 25.15 KG/M2 | RESPIRATION RATE: 16 BRPM | HEART RATE: 71 BPM | DIASTOLIC BLOOD PRESSURE: 80 MMHG | WEIGHT: 169.8 LBS | HEIGHT: 69 IN | SYSTOLIC BLOOD PRESSURE: 132 MMHG

## 2023-07-25 DIAGNOSIS — Z00.00 ANNUAL PHYSICAL EXAM: Primary | ICD-10-CM

## 2023-07-25 DIAGNOSIS — Z13.220 SCREENING, LIPID: ICD-10-CM

## 2023-07-25 DIAGNOSIS — G40.909 SEIZURE DISORDER (HCC): ICD-10-CM

## 2023-07-25 DIAGNOSIS — F41.1 GENERALIZED ANXIETY DISORDER: ICD-10-CM

## 2023-07-25 DIAGNOSIS — Z12.5 SCREENING PSA (PROSTATE SPECIFIC ANTIGEN): ICD-10-CM

## 2023-07-25 DIAGNOSIS — Z00.00 LABORATORY EXAM ORDERED AS PART OF ROUTINE GENERAL MEDICAL EXAMINATION: ICD-10-CM

## 2023-07-25 DIAGNOSIS — D72.818 OTHER DECREASED WHITE BLOOD CELL (WBC) COUNT: ICD-10-CM

## 2023-07-25 PROCEDURE — 99396 PREV VISIT EST AGE 40-64: CPT | Performed by: INTERNAL MEDICINE

## 2023-07-25 RX ORDER — EMTRICITABINE AND TENOFOVIR DISOPROXIL FUMARATE 200; 300 MG/1; MG/1
1 TABLET, FILM COATED ORAL DAILY
COMMUNITY
Start: 2023-07-13

## 2023-07-25 RX ORDER — TACROLIMUS 0.1% IN PSEUDOCATALASE 0.1 G/100G
CREAM TOPICAL
COMMUNITY
Start: 2023-03-07

## 2023-07-25 NOTE — ASSESSMENT & PLAN NOTE
He is back on Lexapro from [facility] since it controls the anxiety better. He has experienced relief of sexual side effects( difficulty sustaining an erection) on daily tadalafil that he obtains online.    He will have me take over the Lexapro at this point but inform his psychiatrist.

## 2023-07-25 NOTE — ASSESSMENT & PLAN NOTE
Normal WBC count, slightly elevated myelocyte count  He will have a CBCD from [facility] in a few months and if this continues, will send to hematology

## 2023-07-25 NOTE — PROGRESS NOTES
[employer]    NAME: Xavier Shelley  AGE: 47 y.o. SEX: male  : 1970     DATE: 2023     Assessment and Plan:     Problem List Items Addressed This Visit        Nervous and Auditory    Seizure disorder ([address])     Long term stable on carbamazepine 300mg BID            Other    Anxiety disorder     He is back on Lexapro from [address] [facility] since it controls the anxiety better. He has experienced relief of sexual side effects( difficulty sustaining an erection) on daily tadalafil that he obtains online. He will have me take over the Lexapro at this point but inform his psychiatrist.          Relevant Medications    escitalopram (LEXAPRO) 20 mg tablet    Leukopenia     Normal WBC count, slightly elevated myelocyte count  He will have a CBCD from [facility] in a few months and if this continues, will send to hematology         Relevant Orders    CBC and differential   Other Visit Diagnoses     Annual physical exam    -  Primary    Laboratory exam ordered as part of routine general medical examination        Relevant Orders    CBC and differential    Comprehensive metabolic panel    PSA, Total Screen    Screening PSA (prostate specific antigen)        Screening, lipid        Relevant Orders    Lipid panel          Immunizations and preventive care screenings were discussed with patient today. Appropriate education was printed on patient's after visit summary. Discussed risks and benefits of prostate cancer screening. We discussed the controversial history of PSA screening for prostate cancer in the [facility] as well as the risk of over detection and over treatment of prostate cancer by way of PSA screening.   The patient understands that PSA blood testing is an imperfect way to screen for prostate cancer and that elevated PSA levels in the blood may also be caused by infection, inflammation, prostatic trauma or manipulation, urological procedures, or by benign prostatic enlargement. The role of the digital rectal examination in prostate cancer screening was also discussed and I discussed with him that there is large interobserver variability in the findings of digital rectal examination. Counseling:  continue healthy diet and regular exercise    BMI Counseling: Body mass index is 25.08 kg/m². The BMI is above normal. Nutrition recommendations include encouraging healthy choices of fruits and vegetables, moderation in carbohydrate intake and reducing intake of saturated and trans fat. Exercise recommendations include exercising 3-5 times per week. Rationale for BMI follow-up plan is due to patient being overweight or obese. Depression Screening and Follow-up Plan: Patient was screened for depression during today's encounter. They screened negative with a PHQ-2 score of 0. Return in about 1 year (around 7/25/2024) for Annual physical.     Chief Complaint:     Chief Complaint   Patient presents with   • Annual Exam      History of Present Illness:     Adult Annual Physical   Patient here for a comprehensive physical exam. The patient reports no problems. Diet and Physical Activity  Diet/Nutrition: well balanced diet. Exercise: vigorous cardiovascular exercise and strength training exercises. Goes to [employer] for exercise    Depression Screening  PHQ-2/9 Depression Screening    Little interest or pleasure in doing things: 0 - not at all  Feeling down, depressed, or hopeless: 0 - not at all  PHQ-2 Score: 0  PHQ-2 Interpretation: Negative depression screen       General Health  Sleep: sleeps well. Hearing: normal - bilateral.  Vision: goes for regular eye exams. Dental: regular dental visits.  Health  Symptoms include: none     Review of Systems:     Review of Systems   Constitutional: Negative for fever and unexpected weight change. Respiratory: Negative for shortness of breath.     Cardiovascular: Negative for chest pain and palpitations. Gastrointestinal: Negative for abdominal pain, constipation and diarrhea. Genitourinary: Negative for difficulty urinating. Musculoskeletal: Negative for arthralgias and myalgias. Neurological: Negative for dizziness and headaches. Past Medical History:     Past Medical History:   Diagnosis Date   • Abdominal pain     Resolved: 6/26/17   • Anal fissure, unspecified 5/18/2018   • Asthma     childhood asthma    • Atypical chest pain     Last assessed: 4/18/14   • Carpal tunnel syndrome     Right. Last assessed: 9/8/17   • Colon polyp    • Dyspepsia     Last assessed: 1/19/17   • Encounter for screening for HIV 3/12/2018   • Overweight (BMI 25.0-29.9) 3/14/2019   • Paresthesia of both feet    • Paresthesias    • Rosacea    • Seizures ([address])     last seizures 10 years ago      Past Surgical History:     Past Surgical History:   Procedure Laterality Date   • COLONOSCOPY     • DENTAL SURGERY      Last assessed: 6/5/15, dental implants   • [place] ESOPHAGOGASTRODUODENOSCOPY TRANSORAL DIAGNOSTIC N/A 2/13/2017    Procedure: EGD AND COLONOSCOPY;  Surgeon: Vevelyn Oppenheim, MD;  Location: [facility];   Service: Gastroenterology      Family History:     Family History   Problem Relation Age of Onset   • Stroke Maternal Grandmother         Cerebrovascular accident   • Colon cancer Maternal Grandmother    • No Known Problems Mother    • No Known Problems Father       Social History:     Social History     Socioeconomic History   • Marital status: Single     Spouse name: None   • Number of children: None   • Years of education: None   • Highest education level: None   Occupational History   • None   Tobacco Use   • Smoking status: Never   • Smokeless tobacco: Never   Vaping Use   • Vaping Use: Never used   Substance and Sexual Activity   • Alcohol use: Yes     Comment: 3 drinks per month -  shots   • Drug use: No   • Sexual activity: Yes   Other Topics Concern   • None   Social History Narrative   • None     Social Determinants of Health     Financial Resource Strain: Not on file   Food Insecurity: Not on file   Transportation Needs: Not on file   Physical Activity: Not on file   Stress: Not on file   Social Connections: Not on file   Intimate Partner Violence: Not on file   Housing Stability: Not on file      Current Medications:     Current Outpatient Medications   Medication Sig Dispense Refill   • carBAMazepine (CARBATROL) 300 MG 12 hr capsule TAKE 1 CAPSULE BY MOUTH TWICE A  capsule 3   • emtricitabine-tenofovir (TRUVADA) 200-300 mg per tablet Take 1 tablet by mouth daily     • escitalopram (LEXAPRO) 20 mg tablet Take 20 mg by mouth daily     • metroNIDAZOLE (METROGEL) 1 % gel      • Tacrolimus 0.1 % CREA      • TADALAFIL PO Take 5 mg by mouth in the morning       No current facility-administered medications for this visit. Allergies:     No Known Allergies   Physical Exam:     /80   Pulse 71   Resp 16   Ht 5' 9" (1.753 m)   Wt 77 kg (169 lb 12.8 oz)   BMI 25.08 kg/m²     Physical Exam  Vitals and nursing note reviewed. Constitutional:       General: He is not in acute distress. Appearance: He is well-developed. He is not ill-appearing, toxic-appearing or diaphoretic. HENT:      Head: Normocephalic and atraumatic. Right Ear: External ear normal. There is no impacted cerumen. Left Ear: External ear normal. There is no impacted cerumen. Eyes:      Conjunctiva/sclera: Conjunctivae normal.   Cardiovascular:      Rate and Rhythm: Normal rate and regular rhythm. Heart sounds: No murmur heard. Pulmonary:      Effort: Pulmonary effort is normal. No respiratory distress. Breath sounds: Normal breath sounds. Abdominal:      Palpations: Abdomen is soft. Tenderness: There is no abdominal tenderness. Musculoskeletal:      Cervical back: Neck supple. Right lower leg: No edema. Left lower leg: No edema.    Neurological:      Mental Status: He is alert.    Psychiatric:         Mood and Affect: Mood normal.         Behavior: Behavior normal.          Aaron Lopez MD  [facility]

## 2023-08-25 ENCOUNTER — TELEPHONE (OUTPATIENT)
Dept: INTERNAL MEDICINE CLINIC | Facility: CLINIC | Age: 53
End: 2023-08-25

## 2023-08-25 DIAGNOSIS — H93.19 TINNITUS, UNSPECIFIED LATERALITY: Primary | ICD-10-CM

## 2023-08-25 NOTE — TELEPHONE ENCOUNTER
Patient called he is asking if you can refer him to an ENT. He said if you have one you recommend that would be great if not just a referral would be good too.

## 2023-09-08 DIAGNOSIS — F41.1 GENERALIZED ANXIETY DISORDER: Primary | ICD-10-CM

## 2023-09-08 RX ORDER — ESCITALOPRAM OXALATE 20 MG/1
20 TABLET ORAL DAILY
Qty: 90 TABLET | Refills: 3 | Status: SHIPPED | OUTPATIENT
Start: 2023-09-08

## 2023-12-19 ENCOUNTER — TELEPHONE (OUTPATIENT)
Dept: UROLOGY | Facility: CLINIC | Age: 53
End: 2023-12-19

## 2023-12-20 NOTE — TELEPHONE ENCOUNTER
Spoke with patient 12/20/23 to inform him of the appointment time of 1/12/24 at 830 am with doctor Mtz

## 2023-12-28 ENCOUNTER — TELEPHONE (OUTPATIENT)
Dept: INTERNAL MEDICINE CLINIC | Facility: CLINIC | Age: 53
End: 2023-12-28

## 2023-12-29 ENCOUNTER — OFFICE VISIT (OUTPATIENT)
Dept: INTERNAL MEDICINE CLINIC | Facility: CLINIC | Age: 53
End: 2023-12-29
Payer: COMMERCIAL

## 2023-12-29 VITALS
HEART RATE: 75 BPM | OXYGEN SATURATION: 97 % | WEIGHT: 182 LBS | DIASTOLIC BLOOD PRESSURE: 82 MMHG | BODY MASS INDEX: 26.96 KG/M2 | SYSTOLIC BLOOD PRESSURE: 142 MMHG | HEIGHT: 69 IN

## 2023-12-29 DIAGNOSIS — K62.89 ANAL PAIN: ICD-10-CM

## 2023-12-29 DIAGNOSIS — R39.198 DIFFICULTY URINATING: Primary | ICD-10-CM

## 2023-12-29 PROCEDURE — 99214 OFFICE O/P EST MOD 30 MIN: CPT | Performed by: INTERNAL MEDICINE

## 2023-12-29 RX ORDER — TAMSULOSIN HYDROCHLORIDE 0.4 MG/1
0.4 CAPSULE ORAL
Qty: 30 CAPSULE | Refills: 5 | Status: SHIPPED | OUTPATIENT
Start: 2023-12-29

## 2023-12-29 NOTE — ASSESSMENT & PLAN NOTE
-Pain is improved.  Symptoms were likely due to an anal fissure.  Encourage patient to use docusate over the next 2 weeks.  Patient instructed to follow-up with gastroenterology if he begins to experience bleeding again.

## 2023-12-29 NOTE — PROGRESS NOTES
Name: Tho Downing      : 1970      MRN: [id number]  Encounter Provider: Luis Alfredo Wilhelm MD  Encounter Date: 2023   Encounter department: [facility]    Assessment & Plan     1. Difficulty urinating  Assessment & Plan:  -Prostate exam not consistent with prostatitis.  Prostate feels mildly enlarged.  He will be given a trial of Flomax.  I have also instructed him to keep his scheduled appointment with urology for further evaluation.  An ultrasound of the bladder/kidneys with postvoid residual has been ordered for further evaluation.  Unfortunately patient was unable to provide a urine sample in office.  He will be given a lab slip for urinalysis so that he may provideby a sample at his nearest lab.    Orders:  -     Ambulatory Referral to Urology; Future  -     US kidney and bladder with pvr; Future; Expected date: 2023  -     tamsulosin (FLOMAX) 0.4 mg; Take 1 capsule (0.4 mg total) by mouth daily with dinner  -     Urinalysis with microscopic    2. Anal pain  Assessment & Plan:  -Pain is improved.  Symptoms were likely due to an anal fissure.  Encourage patient to use docusate over the next 2 weeks.  Patient instructed to follow-up with gastroenterology if he begins to experience bleeding again.           Subjective      HPI  Patient presents today as an acute visit complaining of anal discomfort in addition to difficulty urinating.  He reports anal discomfort started roughly 2 weeks ago.  He reports he was straining at the time to have a bowel movement and subsequently began to notice small amount of blood mainly with wiping.  He reports in the past he was diagnosed with an anal fissure.    He states about a week later he began to notice difficulty with urinating.  He reports difficulty with initiating stream at times feeling like it takes him about a minute to get started.  Even with this he feels his stream is weak.  He denies any fevers, chills or  "unintentional weight loss.    He states he was seen in urgent care for evaluation of his symptoms.  He reports a urinalysis was obtained and found to be normal.      All other systems negative except for pertinent findings noted in HPI.       Current Outpatient Medications on File Prior to Visit   Medication Sig   • carBAMazepine (CARBATROL) 300 MG 12 hr capsule TAKE 1 CAPSULE BY MOUTH TWICE A DAY   • emtricitabine-tenofovir (TRUVADA) 200-300 mg per tablet Take 1 tablet by mouth daily   • escitalopram (LEXAPRO) 20 mg tablet Take 1 tablet (20 mg total) by mouth daily   • fluticasone (FLONASE) 50 mcg/act nasal spray SPRAY 2 SPRAYS INTO EACH NOSTRIL EVERY DAY   • metroNIDAZOLE (METROGEL) 1 % gel    • Tacrolimus 0.1 % CREA    • TADALAFIL PO Take 5 mg by mouth in the morning       Objective     /82 (BP Location: Left arm, Patient Position: Sitting, Cuff Size: Standard)   Pulse 75   Ht 5' 9\" (1.753 m)   Wt 82.6 kg (182 lb)   SpO2 97%   BMI 26.88 kg/m²     BP Readings from Last 3 Encounters:   12/29/23 142/82   07/25/23 132/80   07/21/22 142/88        Wt Readings from Last 3 Encounters:   12/29/23 82.6 kg (182 lb)   12/01/23 76.7 kg (169 lb)   09/01/23 76.7 kg (169 lb)       Physical Exam    General: NAD  HEENT: NCAT, EOMI, normal conjunctiva  Cardiovascular: RRR, normal S1 and S2, no m/r/g  Pulmonary: Normal respiratory effort, no wheezes, rales or rhonchi  Rectal: No anal fissure noted, no tenderness to palpation over prostate  MSK: Normal bulk and tone  Extremities: No lower extremity edema  Skin: Normal skin color, no rashes     Luis Alfredo Wilhelm MD  "

## 2023-12-29 NOTE — PATIENT INSTRUCTIONS
-Your bleeding with bowel movements was likely due to an anal fissure which appears to have healed.  If bleeding reoccurs and/or persist please follow-up with your gastroenterologist.  -Your difficulty urinating may be related to your prostate.  You will be given a trial of Flomax.  An ultrasound has been ordered to assess your kidneys and bladder.  Please keep your appointment with urology as scheduled.

## 2023-12-29 NOTE — ASSESSMENT & PLAN NOTE
-Prostate exam not consistent with prostatitis.  Prostate feels mildly enlarged.  He will be given a trial of Flomax.  I have also instructed him to keep his scheduled appointment with urology for further evaluation.  An ultrasound of the bladder/kidneys with postvoid residual has been ordered for further evaluation.  Unfortunately patient was unable to provide a urine sample in office.  He will be given a lab slip for urinalysis so that he may provideby a sample at his nearest lab.

## 2024-01-04 ENCOUNTER — HOSPITAL ENCOUNTER (OUTPATIENT)
Dept: ULTRASOUND IMAGING | Facility: HOSPITAL | Age: 54
Discharge: HOME | End: 2024-01-04
Attending: INTERNAL MEDICINE
Payer: COMMERCIAL

## 2024-01-04 ENCOUNTER — OFFICE VISIT (OUTPATIENT)
Dept: UROLOGY | Facility: CLINIC | Age: 54
End: 2024-01-04
Payer: COMMERCIAL

## 2024-01-04 VITALS
OXYGEN SATURATION: 98 % | HEART RATE: 73 BPM | HEIGHT: 69 IN | SYSTOLIC BLOOD PRESSURE: 144 MMHG | BODY MASS INDEX: 27.31 KG/M2 | DIASTOLIC BLOOD PRESSURE: 98 MMHG | WEIGHT: 184.4 LBS

## 2024-01-04 DIAGNOSIS — R39.198 DIFFICULTY URINATING: Primary | ICD-10-CM

## 2024-01-04 DIAGNOSIS — R39.198 DIFFICULTY URINATING: ICD-10-CM

## 2024-01-04 LAB
POST-VOID RESIDUAL VOLUME, ML POC: 64 ML
SL AMB  POCT GLUCOSE, UA: NORMAL
SL AMB LEUKOCYTE ESTERASE,UA: NORMAL
SL AMB POCT BILIRUBIN,UA: NORMAL
SL AMB POCT BLOOD,UA: NORMAL
SL AMB POCT CLARITY,UA: CLEAR
SL AMB POCT COLOR,UA: YELLOW
SL AMB POCT KETONES,UA: NORMAL
SL AMB POCT NITRITE,UA: NORMAL
SL AMB POCT PH,UA: 7
SL AMB POCT SPECIFIC GRAVITY,UA: 1
SL AMB POCT URINE PROTEIN: NORMAL
SL AMB POCT UROBILINOGEN: 0.2

## 2024-01-04 PROCEDURE — 99204 OFFICE O/P NEW MOD 45 MIN: CPT | Performed by: PHYSICIAN ASSISTANT

## 2024-01-04 PROCEDURE — 76770 US EXAM ABDO BACK WALL COMP: CPT

## 2024-01-04 PROCEDURE — 51798 US URINE CAPACITY MEASURE: CPT | Performed by: PHYSICIAN ASSISTANT

## 2024-01-04 PROCEDURE — 81002 URINALYSIS NONAUTO W/O SCOPE: CPT | Performed by: PHYSICIAN ASSISTANT

## 2024-01-04 NOTE — PROGRESS NOTES
1/4/2024      Chief Complaint   Patient presents with    New Patient Visit    weak urinary stream     Pt stated that this usually happens in the morning     Difficulty Urinating     Feeling of incomplete emptying          Assessment and Plan    53 y.o. male managed by NEW PATIENT    1. Lower urinary tract symptoms  2. Delayed orgasm    few weeks of urinary hesitancy and frequency, improving  suspect related to pelvic floor stimulation/adjustment after anal intercourse  will continue the flomax for 30 days then stop if feeling better  he will continue the colace and sitz baths for anal discomfort/fissure also improving  could consider PFPT if pelvic issues persist    he will discuss options with his psychiatry team for ssri with wellbutrin combo, or alternative non-ssri for his delayed orgasm that is specifically related to medication  in the interim he is using tadalafil 5mg daily which only partially helps his sensation    PVR 70ml on US earlier today and 64ml in office current    History of Present Illness  Tho Downing is a 53 y.o. male here for evaluation of weak stream and hesitation particularly in the mornings. problem started about a month ago. also had some constipation and anal discomfort around the time with increased urinary frequency. there was a small anal fissure on exam at urgent care and pcp visit without prostate abnormality on exam. He was put on flomax a few days ago. He has a renal bladder US done this morning two normal kidneys small left simple renal cyst and prevoid 437ml and postvoid 70ml with bilateral jets and unremarkable bladder. Screening PSAs have been low and stable 0.2-0.4 for few years. taking lexapro and has delayed orgasm and decreased sensation, improved some when he switched to viibryd but had sleeping issues so went back on lexapro and has same problem again. is sexually active with male partner a week or two prior to onset of his anal and bladder symptoms was first time  "receptive anal intercourse. no pain bleeding or spasm in the act. symptoms began a week later.        Review of Systems   Constitutional: Negative.    Respiratory: Negative.     Cardiovascular: Negative.    Gastrointestinal:  Positive for anal bleeding. Negative for rectal pain.   Genitourinary:  Negative for decreased urine volume, difficulty urinating (hesitant and slower stream), dysuria, flank pain, frequency, hematuria and urgency.   Musculoskeletal: Negative.            AUA SYMPTOM SCORE      Flowsheet Row Most Recent Value   AUA SYMPTOM SCORE    How often have you had a sensation of not emptying your bladder completely after you finished urinating? 2 (P)    How often have you had to urinate again less than two hours after you finished urinating? 2 (P)    How often have you found you stopped and started again several times when you urinate? 1 (P)    How often have you found it difficult to postpone urination? 0 (P)    How often have you had a weak urinary stream? 1 (P)    How often have you had to push or strain to begin urination? 1 (P)    How many times did you most typically get up to urinate from the time you went to bed at night until the time you got up in the morning? 0 (P)    Quality of Life: If you were to spend the rest of your life with your urinary condition just the way it is now, how would you feel about that? 5 (P)    AUA SYMPTOM SCORE 7 (P)              Vitals  Vitals:    01/04/24 0948   BP: 144/98   BP Location: Left arm   Patient Position: Sitting   Cuff Size: Adult   Pulse: 73   SpO2: 98%   Weight: 83.6 kg (184 lb 6.4 oz)   Height: 5' 9\" (1.753 m)       Physical Exam  Vitals and nursing note reviewed.   Constitutional:       General: He is not in acute distress.     Appearance: Normal appearance. He is well-developed. He is not diaphoretic.   HENT:      Head: Normocephalic and atraumatic.   Pulmonary:      Effort: Pulmonary effort is normal.      Comments: No cough or audible " wheeze  Abdominal:      General: There is no distension.      Tenderness: There is no abdominal tenderness. There is no right CVA tenderness or left CVA tenderness.   Genitourinary:     Comments: Circumcised penis, normal phallus, orthotopic patent meatus.  Testes smooth descended bilaterally into the scrotum nontender with no palpable mass.  Digital rectal exam not performed.  Musculoskeletal:      Right lower leg: No edema.      Left lower leg: No edema.   Skin:     General: Skin is warm and dry.   Neurological:      Mental Status: He is alert and oriented to person, place, and time.      Gait: Gait normal.   Psychiatric:         Speech: Speech normal.         Behavior: Behavior normal.           Past History  Past Medical History:   Diagnosis Date    Abdominal pain     Resolved: 6/26/17    Allergic rhinitis     Anal fissure, unspecified 05/18/2018    Asthma     childhood asthma     Atypical chest pain     Last assessed: 4/18/14    Carpal tunnel syndrome     Right.  Last assessed: 9/8/17    Colon polyp     Dyspepsia     Last assessed: 1/19/17    Ear problems     Encounter for screening for HIV 03/12/2018    Overweight (BMI 25.0-29.9) 03/14/2019    Paresthesia of both feet     Paresthesias     Rosacea     Seizures (HCC)     last seizures 10 years ago    Tinnitus      Social History     Socioeconomic History    Marital status: Single     Spouse name: None    Number of children: None    Years of education: None    Highest education level: None   Occupational History    None   Tobacco Use    Smoking status: Never    Smokeless tobacco: Never   Vaping Use    Vaping status: Never Used   Substance and Sexual Activity    Alcohol use: Yes     Comment: 3 drinks per month -  shots    Drug use: Never    Sexual activity: Yes   Other Topics Concern    None   Social History Narrative    None     Social Determinants of Health     Financial Resource Strain: Not on file   Food Insecurity: Not on file   Transportation Needs: Not on  file   Physical Activity: Not on file   Stress: Not on file   Social Connections: Not on file   Intimate Partner Violence: Not on file   Housing Stability: Not on file     Social History     Tobacco Use   Smoking Status Never   Smokeless Tobacco Never     Family History   Problem Relation Age of Onset    No Known Problems Mother     No Known Problems Father     Stroke Maternal Grandmother         Cerebrovascular accident    Colon cancer Maternal Grandmother        The following portions of the patient's history were reviewed and updated as appropriate: allergies, current medications, past medical history, past social history, past surgical history and problem list.    Results  Recent Results (from the past 1 hour(s))   POCT Measure PVR    Collection Time: 01/04/24  9:51 AM   Result Value Ref Range    POST-VOID RESIDUAL VOLUME, ML POC 64 mL   POCT urine dip    Collection Time: 01/04/24  9:54 AM   Result Value Ref Range    LEUKOCYTE ESTERASE,UA -     NITRITE,UA -     SL AMB POCT UROBILINOGEN 0.2     POCT URINE PROTEIN -      PH,UA 7.0     BLOOD,UA -     SPECIFIC GRAVITY,UA 1.005     KETONES,UA -     BILIRUBIN,UA -     GLUCOSE, UA -      COLOR,UA yellow     CLARITY,UA clear    ]  Lab Results   Component Value Date    PSA 0.26 07/24/2023    PSA 0.4 07/07/2022     Lab Results   Component Value Date    GLUCOSE 102 07/27/2015    CALCIUM 8.6 07/24/2023     07/27/2015    K 4.0 07/24/2023    CO2 29 07/24/2023     07/24/2023    BUN 17 07/24/2023    CREATININE 1.01 07/24/2023     Lab Results   Component Value Date    WBC 5.40 07/24/2023    HGB 14.9 07/24/2023    HCT 43.8 07/24/2023    MCV 94 07/24/2023     07/24/2023

## 2024-01-20 DIAGNOSIS — R39.198 DIFFICULTY URINATING: ICD-10-CM

## 2024-01-22 RX ORDER — TAMSULOSIN HYDROCHLORIDE 0.4 MG/1
0.4 CAPSULE ORAL
Qty: 90 CAPSULE | Refills: 1 | Status: SHIPPED | OUTPATIENT
Start: 2024-01-22

## 2024-04-23 DIAGNOSIS — F41.1 GENERALIZED ANXIETY DISORDER: Primary | ICD-10-CM

## 2024-04-23 RX ORDER — ESCITALOPRAM OXALATE 10 MG/1
10 TABLET ORAL DAILY
Qty: 90 TABLET | Refills: 3 | Status: SHIPPED | OUTPATIENT
Start: 2024-04-23

## 2024-06-23 DIAGNOSIS — G40.909 SEIZURE DISORDER (HCC): ICD-10-CM

## 2024-06-24 RX ORDER — CARBAMAZEPINE 300 MG/1
CAPSULE, EXTENDED RELEASE ORAL
Qty: 180 CAPSULE | Refills: 1 | Status: SHIPPED | OUTPATIENT
Start: 2024-06-24

## 2024-07-26 ENCOUNTER — APPOINTMENT (OUTPATIENT)
Dept: LAB | Facility: HOSPITAL | Age: 54
End: 2024-07-26
Payer: COMMERCIAL

## 2024-07-26 DIAGNOSIS — Z00.00 LABORATORY EXAM ORDERED AS PART OF ROUTINE GENERAL MEDICAL EXAMINATION: ICD-10-CM

## 2024-07-26 DIAGNOSIS — D72.818 OTHER DECREASED WHITE BLOOD CELL (WBC) COUNT: ICD-10-CM

## 2024-07-26 DIAGNOSIS — Z13.220 SCREENING, LIPID: ICD-10-CM

## 2024-07-26 LAB
ALBUMIN SERPL BCG-MCNC: 4.1 G/DL (ref 3.5–5)
ALP SERPL-CCNC: 87 U/L (ref 34–104)
ALT SERPL W P-5'-P-CCNC: 15 U/L (ref 7–52)
ANION GAP SERPL CALCULATED.3IONS-SCNC: 5 MMOL/L (ref 4–13)
AST SERPL W P-5'-P-CCNC: 21 U/L (ref 13–39)
BASOPHILS # BLD AUTO: 0.04 THOUSANDS/ÂΜL (ref 0–0.1)
BASOPHILS NFR BLD AUTO: 1 % (ref 0–1)
BILIRUB SERPL-MCNC: 0.39 MG/DL (ref 0.2–1)
BUN SERPL-MCNC: 15 MG/DL (ref 5–25)
CALCIUM SERPL-MCNC: 8.5 MG/DL (ref 8.4–10.2)
CHLORIDE SERPL-SCNC: 104 MMOL/L (ref 96–108)
CHOLEST SERPL-MCNC: 236 MG/DL
CO2 SERPL-SCNC: 28 MMOL/L (ref 21–32)
CREAT SERPL-MCNC: 1.15 MG/DL (ref 0.6–1.3)
EOSINOPHIL # BLD AUTO: 0.1 THOUSAND/ÂΜL (ref 0–0.61)
EOSINOPHIL NFR BLD AUTO: 2 % (ref 0–6)
ERYTHROCYTE [DISTWIDTH] IN BLOOD BY AUTOMATED COUNT: 11.9 % (ref 11.6–15.1)
GFR SERPL CREATININE-BSD FRML MDRD: 72 ML/MIN/1.73SQ M
GLUCOSE P FAST SERPL-MCNC: 91 MG/DL (ref 65–99)
HCT VFR BLD AUTO: 44.1 % (ref 36.5–49.3)
HDLC SERPL-MCNC: 74 MG/DL
HGB BLD-MCNC: 15.1 G/DL (ref 12–17)
IMM GRANULOCYTES # BLD AUTO: 0.08 THOUSAND/UL (ref 0–0.2)
IMM GRANULOCYTES NFR BLD AUTO: 2 % (ref 0–2)
LDLC SERPL CALC-MCNC: 141 MG/DL (ref 0–100)
LYMPHOCYTES # BLD AUTO: 0.84 THOUSANDS/ÂΜL (ref 0.6–4.47)
LYMPHOCYTES NFR BLD AUTO: 18 % (ref 14–44)
MCH RBC QN AUTO: 31.8 PG (ref 26.8–34.3)
MCHC RBC AUTO-ENTMCNC: 34.2 G/DL (ref 31.4–37.4)
MCV RBC AUTO: 93 FL (ref 82–98)
MONOCYTES # BLD AUTO: 0.31 THOUSAND/ÂΜL (ref 0.17–1.22)
MONOCYTES NFR BLD AUTO: 7 % (ref 4–12)
NEUTROPHILS # BLD AUTO: 3.21 THOUSANDS/ÂΜL (ref 1.85–7.62)
NEUTS SEG NFR BLD AUTO: 70 % (ref 43–75)
NONHDLC SERPL-MCNC: 162 MG/DL
NRBC BLD AUTO-RTO: 0 /100 WBCS
PLATELET # BLD AUTO: 184 THOUSANDS/UL (ref 149–390)
PMV BLD AUTO: 10.5 FL (ref 8.9–12.7)
POTASSIUM SERPL-SCNC: 4.1 MMOL/L (ref 3.5–5.3)
PROT SERPL-MCNC: 6.4 G/DL (ref 6.4–8.4)
PSA SERPL-MCNC: 0.29 NG/ML (ref 0–4)
RBC # BLD AUTO: 4.75 MILLION/UL (ref 3.88–5.62)
SODIUM SERPL-SCNC: 137 MMOL/L (ref 135–147)
TRIGL SERPL-MCNC: 107 MG/DL
WBC # BLD AUTO: 4.58 THOUSAND/UL (ref 4.31–10.16)

## 2024-07-26 PROCEDURE — 80061 LIPID PANEL: CPT

## 2024-07-26 PROCEDURE — 36415 COLL VENOUS BLD VENIPUNCTURE: CPT

## 2024-07-26 PROCEDURE — 85025 COMPLETE CBC W/AUTO DIFF WBC: CPT

## 2024-07-26 PROCEDURE — G0103 PSA SCREENING: HCPCS

## 2024-07-26 PROCEDURE — 80053 COMPREHEN METABOLIC PANEL: CPT

## 2024-08-15 ENCOUNTER — OFFICE VISIT (OUTPATIENT)
Dept: INTERNAL MEDICINE CLINIC | Facility: CLINIC | Age: 54
End: 2024-08-15
Payer: COMMERCIAL

## 2024-08-15 VITALS
OXYGEN SATURATION: 99 % | BODY MASS INDEX: 25.65 KG/M2 | HEIGHT: 69 IN | DIASTOLIC BLOOD PRESSURE: 90 MMHG | SYSTOLIC BLOOD PRESSURE: 130 MMHG | HEART RATE: 70 BPM | WEIGHT: 173.2 LBS

## 2024-08-15 DIAGNOSIS — Z00.00 LABORATORY EXAM ORDERED AS PART OF ROUTINE GENERAL MEDICAL EXAMINATION: ICD-10-CM

## 2024-08-15 DIAGNOSIS — F41.1 GENERALIZED ANXIETY DISORDER: ICD-10-CM

## 2024-08-15 DIAGNOSIS — Z12.5 SCREENING PSA (PROSTATE SPECIFIC ANTIGEN): ICD-10-CM

## 2024-08-15 DIAGNOSIS — G40.909 SEIZURE DISORDER (HCC): ICD-10-CM

## 2024-08-15 DIAGNOSIS — Z23 NEED FOR VACCINATION: ICD-10-CM

## 2024-08-15 DIAGNOSIS — Z00.00 ANNUAL PHYSICAL EXAM: Primary | ICD-10-CM

## 2024-08-15 DIAGNOSIS — Z13.220 SCREENING, LIPID: ICD-10-CM

## 2024-08-15 PROBLEM — R39.198 DIFFICULTY URINATING: Status: RESOLVED | Noted: 2023-12-29 | Resolved: 2024-08-15

## 2024-08-15 PROBLEM — K62.89 ANAL PAIN: Status: RESOLVED | Noted: 2023-12-29 | Resolved: 2024-08-15

## 2024-08-15 PROBLEM — D72.819 LEUKOPENIA: Status: RESOLVED | Noted: 2022-03-15 | Resolved: 2024-08-15

## 2024-08-15 PROCEDURE — 99396 PREV VISIT EST AGE 40-64: CPT | Performed by: INTERNAL MEDICINE

## 2024-08-15 PROCEDURE — 90715 TDAP VACCINE 7 YRS/> IM: CPT

## 2024-08-15 PROCEDURE — 90471 IMMUNIZATION ADMIN: CPT

## 2024-08-15 NOTE — PROGRESS NOTES
Adult Annual Physical  Name: Tho Downing      : 1970      MRN: [id number]  Encounter Provider: Lea Reyes, MD  Encounter Date: 8/15/2024   [facility]    Assessment & Plan   1. Annual physical exam  2. Need for vaccination  -     Tdap vaccine greater than or equal to 8yo IM  3. Laboratory exam ordered as part of routine general medical examination  -     CBC and differential; Future; Expected date: 2025  -     Comprehensive metabolic panel; Future; Expected date: 2025  4. Screening PSA (prostate specific antigen)  -     PSA, Total Screen; Future; Expected date: 2025  5. Screening, lipid  -     Lipid panel; Future; Expected date: 2025  6. Seizure disorder (HCC)  Assessment & Plan:  Stable on carbamazepine  7. Generalized anxiety disorder  Assessment & Plan:  Stable on Lexapro  Immunizations and preventive care screenings were discussed with patient today. Appropriate education was printed on patient's after visit summary.      Counseling:  Low fat diet  Get second Shingrix from pharmacy         History of Present Illness     Adult Annual Physical:  Patient presents for annual physical.     Diet and Physical Activity:  - Diet/Nutrition: poor diet.  - Exercise: 5-7 times a week on average.    Depression Screening:  - PHQ-2 Score: 2    General Health:  - Sleep: sleeps well.  - Hearing: normal hearing bilateral ears and tinnitus.  - Vision: goes for regular eye exams.  - Dental: regular dental visits.    Review of Systems   Constitutional:  Negative for unexpected weight change.   HENT:  Positive for tinnitus. Negative for hearing loss.    Respiratory:  Negative for shortness of breath.    Cardiovascular:  Negative for chest pain and palpitations.   Gastrointestinal:  Negative for abdominal pain, constipation and diarrhea.   Genitourinary:  Negative for difficulty urinating.   Musculoskeletal:  Negative for arthralgias and myalgias.  "  Neurological:  Negative for dizziness and headaches.         Objective     /90   Pulse 70   Ht 5' 9\" (1.753 m)   Wt 78.6 kg (173 lb 3.2 oz)   SpO2 99%   BMI 25.58 kg/m²     Physical Exam  Vitals and nursing note reviewed.   Constitutional:       General: He is not in acute distress.     Appearance: He is well-developed. He is not ill-appearing.   HENT:      Right Ear: Tympanic membrane and ear canal normal.      Left Ear: Tympanic membrane and ear canal normal.   Eyes:      Conjunctiva/sclera: Conjunctivae normal.   Cardiovascular:      Rate and Rhythm: Normal rate and regular rhythm.      Heart sounds: No murmur heard.  Pulmonary:      Effort: Pulmonary effort is normal. No respiratory distress.      Breath sounds: Normal breath sounds.   Abdominal:      Palpations: Abdomen is soft.      Tenderness: There is no abdominal tenderness.   Musculoskeletal:      Cervical back: Neck supple.      Right lower leg: No edema.      Left lower leg: No edema.   Neurological:      Mental Status: He is alert.   Psychiatric:         Mood and Affect: Mood normal.         Behavior: Behavior normal.         "

## 2024-08-15 NOTE — PATIENT INSTRUCTIONS
"Patient Education     Routine physical for adults   The Basics   Written by the doctors and editors at [facility]   What is a physical? -- A physical is a routine visit, or \"check-up,\" with your doctor. You might also hear it called a \"wellness visit\" or \"preventive visit.\"  During each visit, the doctor will:   Ask about your physical and mental health   Ask about your habits, behaviors, and lifestyle   Do an exam   Give you vaccines if needed   Talk to you about any medicines you take   Give advice about your health   Answer your questions  Getting regular check-ups is an important part of taking care of your health. It can help your doctor find and treat any problems you have. But it's also important for preventing health problems.  A routine physical is different from a \"sick visit.\" A sick visit is when you see a doctor because of a health concern or problem. Since physicals are scheduled ahead of time, you can think about what you want to ask the doctor.  How often should I get a physical? -- It depends on your age and health. In general, for people age 21 years and older:   If you are younger than 50 years, you might be able to get a physical every 3 years.   If you are 50 years or older, your doctor might recommend a physical every year.  If you have an ongoing health condition, like diabetes or high blood pressure, your doctor will probably want to see you more often.  What happens during a physical? -- In general, each visit will include:   Physical exam - The doctor or nurse will check your height, weight, heart rate, and blood pressure. They will also look at your eyes and ears. They will ask about how you are feeling and whether you have any symptoms that bother you.   Medicines - It's a good idea to bring a list of all the medicines you take to each doctor visit. Your doctor will talk to you about your medicines and answer any questions. Tell them if you are having any side effects that bother you. You " "should also tell them if you are having trouble paying for any of your medicines.   Habits and behaviors - This includes:   Your diet   Your exercise habits   Whether you smoke, drink alcohol, or use drugs   Whether you are sexually active   Whether you feel safe at home  Your doctor will talk to you about things you can do to improve your health and lower your risk of health problems. They will also offer help and support. For example, if you want to quit smoking, they can give you advice and might prescribe medicines. If you want to improve your diet or get more physical activity, they can help you with this, too.   Lab tests, if needed - The tests you get will depend on your age and situation. For example, your doctor might want to check your:   Cholesterol   Blood sugar   Iron level   Vaccines - The recommended vaccines will depend on your age, health, and what vaccines you already had. Vaccines are very important because they can prevent certain serious or deadly infections.   Discussion of screening - \"Screening\" means checking for diseases or other health problems before they cause symptoms. Your doctor can recommend screening based on your age, risk, and preferences. This might include tests to check for:   Cancer, such as breast, prostate, cervical, ovarian, colorectal, prostate, lung, or skin cancer   Sexually transmitted infections, such as chlamydia and gonorrhea   Mental health conditions like depression and anxiety  Your doctor will talk to you about the different types of screening tests. They can help you decide which screenings to have. They can also explain what the results might mean.   Answering questions - The physical is a good time to ask the doctor or nurse questions about your health. If needed, they can refer you to other doctors or specialists, too.  Adults older than 65 years often need other care, too. As you get older, your doctor will talk to you about:   How to prevent falling at " home   Hearing or vision tests   Memory testing   How to take your medicines safely   Making sure that you have the help and support you need at home  All topics are updated as new evidence becomes available and our peer review process is complete.  This topic retrieved from [employer] on: May 02, 2024.  Topic 928455 Version 1.0  Release: 32.4.3 - C32.122  © 2024 UpToDate, Inc. and/or its affiliates. All rights reserved.  Consumer Information Use and Disclaimer   Disclaimer: This generalized information is a limited summary of diagnosis, treatment, and/or medication information. It is not meant to be comprehensive and should be used as a tool to help the user understand and/or assess potential diagnostic and treatment options. It does NOT include all information about conditions, treatments, medications, side effects, or risks that may apply to a specific patient. It is not intended to be medical advice or a substitute for the medical advice, diagnosis, or treatment of a health care provider based on the health care provider's examination and assessment of a patient's specific and unique circumstances. Patients must speak with a health care provider for complete information about their health, medical questions, and treatment options, including any risks or benefits regarding use of medications. This information does not endorse any treatments or medications as safe, effective, or approved for treating a specific patient. UpToDate, Inc. and its affiliates disclaim any warranty or liability relating to this information or the use thereof.The use of this information is governed by the Terms of Use, available at https://www.[employer].com/en/know/clinical-effectiveness-terms. 2024© UpToDate, Inc. and its affiliates and/or licensors. All rights reserved.  Copyright   © 2024 UpToDate, Inc. and/or its affiliates. All rights reserved.

## 2024-09-26 ENCOUNTER — TELEPHONE (OUTPATIENT)
Dept: GASTROENTEROLOGY | Facility: CLINIC | Age: 54
End: 2024-09-26

## 2024-09-26 NOTE — TELEPHONE ENCOUNTER
09/26/24  Screened by: Kristen Catherine    Referring Provider Tomas    Pre- Screening:     There is no height or weight on file to calculate BMI.  Has patient been referred for a routine screening Colonoscopy? yes  Is the patient between 45-75 years old? yes      Previous Colonoscopy yes   If yes:    Date: 11/30/21    [facility]ility: [facility]    Reason: routine      SCHEDULING STAFF: If the patient is between 45yrs-49yrs, please advise patient to confirm benefits/coverage with their insurance company for a routine screening colonoscopy, some insurance carriers will only cover at 50yrs or older. If the patient is over 75years old, please schedule an office visit.     Does the patient want to see a Gastroenterologist prior to their procedure OR are they having any GI symptoms? no    Has the patient been hospitalized or had abdominal surgery in the past 6 months? no    Does the patient use supplemental oxygen? no    Does the patient take Coumadin, Lovenox, Plavix, Elliquis, Xarelto, or other blood thinning medication? no    Has the patient had a stroke, cardiac event, or stent placed in the past year? no    SCHEDULING STAFF: If patient answers NO to above questions, then schedule procedure. If patient answers YES to above questions, then schedule office appointment.     If patient is between 45yrs - 49yrs, please advise patient that we will have to confirm benefits & coverage with their insurance company for a routine screening colonoscopy.

## 2024-10-04 DIAGNOSIS — Z00.6 ENCOUNTER FOR EXAMINATION FOR NORMAL COMPARISON OR CONTROL IN CLINICAL RESEARCH PROGRAM: ICD-10-CM

## 2024-10-11 ENCOUNTER — APPOINTMENT (OUTPATIENT)
Dept: LAB | Facility: HOSPITAL | Age: 54
End: 2024-10-11

## 2024-10-11 DIAGNOSIS — Z00.6 ENCOUNTER FOR EXAMINATION FOR NORMAL COMPARISON OR CONTROL IN CLINICAL RESEARCH PROGRAM: ICD-10-CM

## 2024-10-11 PROCEDURE — 36415 COLL VENOUS BLD VENIPUNCTURE: CPT

## 2024-10-20 LAB
APOB+LDLR+PCSK9 GENE MUT ANL BLD/T: NOT DETECTED
BRCA1+BRCA2 DEL+DUP + FULL MUT ANL BLD/T: NOT DETECTED
MLH1+MSH2+MSH6+PMS2 GN DEL+DUP+FUL M: NOT DETECTED

## 2024-11-21 ENCOUNTER — ANESTHESIA EVENT (OUTPATIENT)
Dept: ANESTHESIOLOGY | Facility: HOSPITAL | Age: 54
End: 2024-11-21

## 2024-11-21 ENCOUNTER — ANESTHESIA (OUTPATIENT)
Dept: ANESTHESIOLOGY | Facility: HOSPITAL | Age: 54
End: 2024-11-21

## 2024-11-26 ENCOUNTER — OFFICE VISIT (OUTPATIENT)
Dept: INTERNAL MEDICINE CLINIC | Facility: CLINIC | Age: 54
End: 2024-11-26
Payer: COMMERCIAL

## 2024-11-26 VITALS
SYSTOLIC BLOOD PRESSURE: 138 MMHG | DIASTOLIC BLOOD PRESSURE: 82 MMHG | BODY MASS INDEX: 25.31 KG/M2 | OXYGEN SATURATION: 98 % | HEART RATE: 71 BPM | WEIGHT: 171.4 LBS

## 2024-11-26 DIAGNOSIS — J32.9 RECURRENT SINUSITIS: Primary | ICD-10-CM

## 2024-11-26 PROCEDURE — 99213 OFFICE O/P EST LOW 20 MIN: CPT | Performed by: INTERNAL MEDICINE

## 2024-11-26 NOTE — PROGRESS NOTES
Name: Tho Downing      : 1970      MRN: [id number]  Encounter Provider: Luis Alfredo Wilhelm MD  Encounter Date: 2024   Encounter department: [facility]  :  Assessment & Plan  Recurrent sinusitis  -Patient currently on 3-week course of Augmentin per ENT.  He notes only minimal improvement.  Will obtain CT sinus to assess for any structural abnormalities.  Encourage patient to contact his ENT if there is no improvement with antibiotic therapy over the next several days.  Continue Mucinex and Flonase as prescribed.  Recommended discussing allergy testing during his follow-up visit with ENT in December.  Orders:    CT sinus wo contrast; Future           History of Present Illness     HPI  Patient presents today as an acute visit complaining of recurrent sinus infection.  He reports he was initially diagnosed with a sinus infection in October for which she was given a course of Augmentin as well as a Medrol Dosepak.  He states his symptoms resolved completely however a few weeks later his sinus congestion/pressure return.  He was seen by ENT on  and started on extended 3-week course of Augmentin.  He has been on antibiotics for 4 to 5 days and notes only mild improvement.  He denies any fevers or chills.  He has been using Flonase daily in addition to taking Mucinex.    Review of Systems  All other systems negative except for pertinent findings noted in HPI.          Objective   /82   Pulse 71   Wt 77.7 kg (171 lb 6.4 oz)   SpO2 98%   BMI 25.31 kg/m²      Physical Exam  General: NAD  HEENT: NCAT, EOMI, normal conjunctiva, no maxillary or frontal sinus tenderness to palpation  Cardiovascular: RRR, normal S1 and S2, no m/r/g  Pulmonary: Normal respiratory effort, no wheezes, rales or rhonchi  Extremities: No lower extremity edema  Skin: Normal skin color, no rashes   Psychiatric: Normal mood and affect

## 2024-11-29 ENCOUNTER — OFFICE VISIT (OUTPATIENT)
Dept: INTERNAL MEDICINE CLINIC | Facility: CLINIC | Age: 54
End: 2024-11-29
Payer: COMMERCIAL

## 2024-11-29 VITALS
WEIGHT: 172.4 LBS | OXYGEN SATURATION: 98 % | SYSTOLIC BLOOD PRESSURE: 124 MMHG | DIASTOLIC BLOOD PRESSURE: 80 MMHG | BODY MASS INDEX: 25.46 KG/M2 | HEART RATE: 77 BPM

## 2024-11-29 DIAGNOSIS — R21 RASH: Primary | ICD-10-CM

## 2024-11-29 PROCEDURE — 99213 OFFICE O/P EST LOW 20 MIN: CPT | Performed by: INTERNAL MEDICINE

## 2024-11-29 RX ORDER — FAMCICLOVIR 500 MG/1
500 TABLET ORAL 3 TIMES DAILY
Qty: 21 TABLET | Refills: 0 | Status: SHIPPED | OUTPATIENT
Start: 2024-11-29 | End: 2024-12-06

## 2024-11-29 NOTE — PATIENT INSTRUCTIONS
Problem List Items Addressed This Visit          Musculoskeletal and Integument    Rash - Primary    Appearance consisent with herpes rash.  Will treat with Famvir 500 mg 3 times a day for a week.  Discussed that after a week of antiviral the rash will not be resolved, but should continue to resolve with scabbing over and then healing.  If patient has ongoing pain, there can be other treatments for this, but would expect the pain to improve as the rash resolves         Relevant Medications    famciclovir (FAMVIR) 500 mg tablet

## 2024-11-29 NOTE — PROGRESS NOTES
Name: Tho Downing      : 1970      MRN: [id number]  Encounter Provider: Madhav Delaney MD  Encounter Date: 2024   Enc[facility] department[facility]    Assessment & Plan  Rash  Appearance consisent with herpes rash.  Will treat with Famvir 500 mg 3 times a day for a week.  Discussed that after a week of antiviral the rash will not be resolved, but should continue to resolve with scabbing over and then healing.  If patient has ongoing pain, there can be other treatments for this, but would expect the pain to improve as the rash resolves    Orders:    famciclovir (FAMVIR) 500 mg tablet; Take 1 tablet (500 mg total) by mouth 3 (three) times a day for 7 days         History of Present Illness     I am seeing patient in coverage today for an acute issue.  Patient developed a rash right buttock about 3 days ago, no itching, he has some discomfort in the area..    Rash  This is a new problem. The current episode started in the past 7 days. The problem has been gradually worsening since onset. The affected locations include the right buttock. The rash is characterized by pain, redness and swelling. He was exposed to a new medication. Pertinent negatives include no anorexia, congestion, cough, diarrhea, facial edema, fatigue, fever, joint pain, rhinorrhea, shortness of breath, sore throat or vomiting.     Review of Systems   Constitutional:  Negative for fatigue and fever.   HENT:  Negative for congestion, rhinorrhea and sore throat.    Eyes:  Negative for pain.   Respiratory:  Negative for cough and shortness of breath.    Gastrointestinal:  Negative for anorexia, diarrhea and vomiting.   Musculoskeletal:  Negative for joint pain.   Skin:  Positive for rash.     Past Medical History:   Diagnosis Date    Abdominal pain     Resolved: 17    Allergic rhinitis     Anal fissure, unspecified 2018    Anal pain 2023    Anxiety 2016    Asthma     childhood asthma      Atypical chest pain     Last assessed: 4/18/14    Carpal tunnel syndrome     Right.  Last assessed: 9/8/17    Colon polyp     Difficulty urinating 12/29/2023    Dyspepsia     Last assessed: 1/19/17    Ear problems     Encounter for screening for HIV 03/12/2018    Leukopenia 03/15/2022    Overweight (BMI 25.0-29.9) 03/14/2019    Paresthesia of both feet     Paresthesias     Rosacea     Seizures (HCC)     last seizures 10 years ago    Tinnitus      Past Surgical History:   Procedure Laterality Date    COLONOSCOPY      DENTAL SURGERY      Last assessed: 6/5/15, dental implants    [place] ESOPHAGOGASTRODUODENOSCOPY TRANSORAL DIAGNOSTIC N/A 2/13/2017    Procedure: EGD AND COLONOSCOPY;  Surgeon: Taj Marin MD;  Location: [facility];  Service: Gastroenterology     Family History   Problem Relation Age of Onset    No Known Problems Mother     No Known Problems Father     Stroke Maternal Grandmother         Cerebrovascular accident    Colon cancer Maternal Grandmother     Cancer Maternal Grandmother     Depression Brother     Completed Suicide  Brother      Social History     Tobacco Use    Smoking status: Never    Smokeless tobacco: Never   Vaping Use    Vaping status: Never Used   Substance and Sexual Activity    Alcohol use: Yes     Alcohol/week: 1.0 standard drink of alcohol     Types: 1 Standard drinks or equivalent per week     Comment: 3 drinks per month -  shots    Drug use: No    Sexual activity: Not Currently     Partners: Male     Birth control/protection: Condom Male     Current Outpatient Medications on File Prior to Visit   Medication Sig    carBAMazepine (CARBATROL) 300 MG 12 hr capsule TAKE 1 CAPSULE BY MOUTH TWICE A DAY    emtricitabine-tenofovir (TRUVADA) 200-300 mg per tablet Take 1 tablet by mouth daily    escitalopram (Lexapro) 10 mg tablet Take 1 tablet (10 mg total) by mouth daily    fluticasone (FLONASE) 50 mcg/act nasal spray SPRAY 2 SPRAYS INTO EACH NOSTRIL EVERY DAY    metroNIDAZOLE (METROGEL) 1 % gel      Tacrolimus 0.1 % CREA     amoxicillin-clavulanate (AUGMENTIN) 875-125 mg per tablet Take 1 tablet by mouth every 12 (twelve) hours for 21 days (Patient not taking: Reported on 11/29/2024)    doxycycline hyclate (VIBRAMYCIN) 100 mg capsule Take 1 capsule by mouth 2 (two) times a day (Patient not taking: Reported on 11/29/2024)     Allergies   Allergen Reactions    Other Other (See Comments)     Seasonal     Immunization History   Administered Date(s) Administered    COVID-19 J&J (M-Audio) vaccine 0.5 mL 04/06/2021    COVID-19 MODERNA VACC 0.5 ML IM 07/09/2022    COVID-19 PFIZER VACCINE 0.3 ML IM 11/01/2021    COVID-19 Pfizer mRNA vacc PF amina-sucrose 12 yr and older (Comirnaty) 10/14/2023    INFLUENZA 10/11/2018, 12/13/2019, 12/28/2022, 10/14/2023    Influenza Quadrivalent Preservative Free 3 years and older IM 10/23/2021    Influenza, injectable, quadrivalent, preservative free 0.5 mL 11/10/2020    Smallpox Monkeypox Vaccine, Live Attenuated, Preservative-Free 09/07/2022, 10/11/2022    Tdap 04/20/2014, 08/15/2024    Zoster Vaccine Recombinant 07/16/2023     Objective   /80 (BP Location: Left arm, Patient Position: Sitting, Cuff Size: Large)   Pulse 77   Wt 78.2 kg (172 lb 6.4 oz)   SpO2 98%   BMI 25.46 kg/m²     Physical Exam  Skin:     Comments: Grouped flattened vesicles, no ulcerations noted, erythema in area   Neurological:      Mental Status: He is alert.         Answers submitted by the patient for this visit:  Rash Questionnaire (Submitted on 11/29/2024)  Chief Complaint: Rash  nail changes: No

## 2024-11-29 NOTE — ASSESSMENT & PLAN NOTE
Appearance consisent with herpes rash.  Will treat with Famvir 500 mg 3 times a day for a week.  Discussed that after a week of antiviral the rash will not be resolved, but should continue to resolve with scabbing over and then healing.  If patient has ongoing pain, there can be other treatments for this, but would expect the pain to improve as the rash resolves    Orders:    famciclovir (FAMVIR) 500 mg tablet; Take 1 tablet (500 mg total) by mouth 3 (three) times a day for 7 days

## 2024-12-03 ENCOUNTER — HOSPITAL ENCOUNTER (OUTPATIENT)
Dept: CT IMAGING | Facility: HOSPITAL | Age: 54
Discharge: HOME | End: 2024-12-03
Payer: COMMERCIAL

## 2024-12-03 DIAGNOSIS — J32.9 RECURRENT SINUSITIS: ICD-10-CM

## 2024-12-03 PROCEDURE — 70486 CT MAXILLOFACIAL W/O DYE: CPT

## 2024-12-05 ENCOUNTER — HOSPITAL ENCOUNTER (OUTPATIENT)
Dept: GASTROENTEROLOGY | Facility: MEDICAL CENTER | Age: 54
Setting detail: OUTPATIENT SURGERY
End: 2024-12-05
Attending: INTERNAL MEDICINE
Payer: COMMERCIAL

## 2024-12-05 ENCOUNTER — ANESTHESIA EVENT (OUTPATIENT)
Dept: GASTROENTEROLOGY | Facility: MEDICAL CENTER | Age: 54
End: 2024-12-05
Payer: COMMERCIAL

## 2024-12-05 ENCOUNTER — ANESTHESIA (OUTPATIENT)
Dept: GASTROENTEROLOGY | Facility: MEDICAL CENTER | Age: 54
End: 2024-12-05
Payer: COMMERCIAL

## 2024-12-05 ENCOUNTER — RESULTS FOLLOW-UP (OUTPATIENT)
Dept: INTERNAL MEDICINE CLINIC | Facility: CLINIC | Age: 54
End: 2024-12-05

## 2024-12-05 VITALS
RESPIRATION RATE: 16 BRPM | OXYGEN SATURATION: 99 % | SYSTOLIC BLOOD PRESSURE: 118 MMHG | HEART RATE: 73 BPM | DIASTOLIC BLOOD PRESSURE: 74 MMHG | TEMPERATURE: 97.7 F

## 2024-12-05 DIAGNOSIS — Z12.10 ENCOUNTER FOR SCREENING FOR MALIGNANT NEOPLASM OF INTESTINAL TRACT: ICD-10-CM

## 2024-12-05 PROCEDURE — 45385 COLONOSCOPY W/LESION REMOVAL: CPT | Performed by: INTERNAL MEDICINE

## 2024-12-05 PROCEDURE — 88305 TISSUE EXAM BY PATHOLOGIST: CPT | Performed by: PATHOLOGY

## 2024-12-05 RX ORDER — PROPOFOL 10 MG/ML
INJECTION, EMULSION INTRAVENOUS AS NEEDED
Status: DISCONTINUED | OUTPATIENT
Start: 2024-12-05 | End: 2024-12-05

## 2024-12-05 RX ORDER — SODIUM CHLORIDE 9 MG/ML
INJECTION, SOLUTION INTRAVENOUS CONTINUOUS PRN
Status: DISCONTINUED | OUTPATIENT
Start: 2024-12-05 | End: 2024-12-05

## 2024-12-05 RX ADMIN — SODIUM CHLORIDE 500 ML: 0.9 INJECTION, SOLUTION INTRAVENOUS at 13:33

## 2024-12-05 RX ADMIN — PROPOFOL 70 MG: 10 INJECTION, EMULSION INTRAVENOUS at 13:49

## 2024-12-05 RX ADMIN — PROPOFOL 100 MG: 10 INJECTION, EMULSION INTRAVENOUS at 13:48

## 2024-12-05 RX ADMIN — PROPOFOL 150 MCG/KG/MIN: 10 INJECTION, EMULSION INTRAVENOUS at 13:51

## 2024-12-05 RX ADMIN — SODIUM CHLORIDE: 0.9 INJECTION, SOLUTION INTRAVENOUS at 13:41

## 2024-12-05 RX ADMIN — SODIUM CHLORIDE: 0.9 INJECTION, SOLUTION INTRAVENOUS at 14:00

## 2024-12-05 NOTE — H&P
History and Physical - [facility]  Tho Downing 54 y.o. male MRN: [id number]                  HPI: Tho Downing is a 54 y.o. year old male who presents for colonoscopy eval.       REVIEW OF SYSTEMS: Per the HPI, and otherwise unremarkable.    Historical Information   Past Medical History:   Diagnosis Date    Abdominal pain     Resolved: 6/26/17    Allergic rhinitis     Anal fissure, unspecified 05/18/2018    Anal pain 12/29/2023    Anxiety 06/2016    Asthma     childhood asthma     Atypical chest pain     Last assessed: 4/18/14    Carpal tunnel syndrome     Right.  Last assessed: 9/8/17    Colon polyp     Difficulty urinating 12/29/2023    Dyspepsia     Last assessed: 1/19/17    Ear problems     Encounter for screening for HIV 03/12/2018    Leukopenia 03/15/2022    Overweight (BMI 25.0-29.9) 03/14/2019    Paresthesia of both feet     Paresthesias     Rosacea     Seizures (HCC)     last seizures 10 years ago    Tinnitus      Past Surgical History:   Procedure Laterality Date    COLONOSCOPY      DENTAL SURGERY      Last assessed: 6/5/15, dental implants    [place] ESOPHAGOGASTRODUODENOSCOPY TRANSORAL DIAGNOSTIC N/A 2/13/2017    Procedure: EGD AND COLONOSCOPY;  Surgeon: Taj Marin MD;  Location: [facility];  Service: Gastroenterology     Social History   Social History     Substance and Sexual Activity   Alcohol Use Yes    Alcohol/week: 1.0 standard drink of alcohol    Types: 1 Standard drinks or equivalent per week    Comment: 3 drinks per month -  shots     Social History     Substance and Sexual Activity   Drug Use No     Social History     Tobacco Use   Smoking Status Never   Smokeless Tobacco Never     Family History   Problem Relation Age of Onset    No Known Problems Mother     No Known Problems Father     Stroke Maternal Grandmother         Cerebrovascular accident    Colon cancer Maternal Grandmother     Cancer Maternal Grandmother     Depression Brother     Completed Suicide  Brother         Meds/Allergies       Current Outpatient Medications:     amoxicillin-clavulanate (AUGMENTIN) 875-125 mg per tablet    carBAMazepine (CARBATROL) 300 MG 12 hr capsule    doxycycline hyclate (VIBRAMYCIN) 100 mg capsule    emtricitabine-tenofovir (TRUVADA) 200-300 mg per tablet    escitalopram (Lexapro) 10 mg tablet    famciclovir (FAMVIR) 500 mg tablet    fluticasone (FLONASE) 50 mcg/act nasal spray    metroNIDAZOLE (METROGEL) 1 % gel    Tacrolimus 0.1 % CREA    Allergies   Allergen Reactions    Other Other (See Comments)     Seasonal       Objective     There were no vitals taken for this visit.      PHYSICAL EXAM    Gen: NAD  Head: NCAT  CV: RRR  CHEST: Clear  ABD: soft, NT/ND  EXT: no edema      ASSESSMENT/PLAN:  This is a 54 y.o. year old male here for crc screening, and he is stable and optimized for his procedure.

## 2024-12-05 NOTE — ANESTHESIA POSTPROCEDURE EVALUATION
Post-Op Assessment Note    CV Status:  Stable  Pain Score: 0    Pain management: adequate       Mental Status:  Alert and awake   Hydration Status:  Euvolemic   PONV Controlled:  Controlled   Airway Patency:  Patent     Post Op Vitals Reviewed: Yes    No anethesia notable event occurred.    Staff: CRNA           Last Filed PACU Vitals:  Vitals Value Taken Time   Temp     Pulse 86    /67    Resp 16    SpO2 97 ra        Modified Rodolfo:  Activity: 2 (12/5/2024  1:19 PM)  Respiration: 2 (12/5/2024  1:19 PM)  Circulation: 2 (12/5/2024  1:19 PM)  Consciousness: 2 (12/5/2024  1:19 PM)  Oxygen Saturation: 2 (12/5/2024  1:19 PM)  Modified Rodolfo Score: 10 (12/5/2024  1:19 PM)

## 2024-12-05 NOTE — ANESTHESIA PREPROCEDURE EVALUATION
Procedure:  COLONOSCOPY    Relevant Problems   ANESTHESIA (within normal limits)      CARDIO (within normal limits)      ENDO (within normal limits)      GI/HEPATIC (within normal limits)      /RENAL (within normal limits)      HEMATOLOGY (within normal limits)      MUSCULOSKELETAL (within normal limits)      NEURO/PSYCH   (+) Anxiety disorder   (+) Seizure disorder (HCC)      PULMONARY (within normal limits)        Physical Exam    Airway    Mallampati score: II  TM Distance: >3 FB  Neck ROM: full     Dental   No notable dental hx     Cardiovascular  Cardiovascular exam normal    Pulmonary  Pulmonary exam normal     Other Findings        Anesthesia Plan  ASA Score- 2     Anesthesia Type- IV sedation with anesthesia with ASA Monitors.         Additional Monitors:     Airway Plan:            Plan Factors-Exercise tolerance (METS): >4 METS.    Chart reviewed. EKG reviewed. Imaging results reviewed. Existing labs reviewed. Patient summary reviewed.    Patient is not a current smoker.      Obstructive sleep apnea risk education given perioperatively.        Induction- intravenous.    Postoperative Plan-         Informed Consent- Anesthetic plan and risks discussed with patient.  I personally reviewed this patient with the CRNA. Discussed and agreed on the Anesthesia Plan with the CRNA..

## 2024-12-09 PROCEDURE — 88305 TISSUE EXAM BY PATHOLOGIST: CPT | Performed by: PATHOLOGY

## 2024-12-10 ENCOUNTER — RESULTS FOLLOW-UP (OUTPATIENT)
Dept: GASTROENTEROLOGY | Facility: CLINIC | Age: 54
End: 2024-12-10

## 2024-12-25 DIAGNOSIS — G40.909 SEIZURE DISORDER (HCC): ICD-10-CM

## 2024-12-26 DIAGNOSIS — G40.909 SEIZURE DISORDER (HCC): ICD-10-CM

## 2024-12-26 RX ORDER — CARBAMAZEPINE 300 MG/1
300 CAPSULE, EXTENDED RELEASE ORAL 2 TIMES DAILY
Qty: 180 CAPSULE | Refills: 3 | Status: SHIPPED | OUTPATIENT
Start: 2024-12-26

## 2024-12-26 RX ORDER — CARBAMAZEPINE 300 MG/1
300 CAPSULE, EXTENDED RELEASE ORAL 2 TIMES DAILY
Qty: 60 CAPSULE | Refills: 0 | Status: SHIPPED | OUTPATIENT
Start: 2024-12-26 | End: 2024-12-26 | Stop reason: SDUPTHER

## 2025-02-16 ENCOUNTER — HOSPITAL ENCOUNTER (EMERGENCY)
Facility: HOSPITAL | Age: 55
Discharge: HOME | End: 2025-02-17
Attending: EMERGENCY MEDICINE
Payer: COMMERCIAL

## 2025-02-16 VITALS
OXYGEN SATURATION: 97 % | BODY MASS INDEX: 26.34 KG/M2 | HEART RATE: 72 BPM | RESPIRATION RATE: 18 BRPM | TEMPERATURE: 98.5 F | SYSTOLIC BLOOD PRESSURE: 165 MMHG | DIASTOLIC BLOOD PRESSURE: 104 MMHG | WEIGHT: 178.35 LBS

## 2025-02-16 DIAGNOSIS — S61.419A HAND LACERATION: Primary | ICD-10-CM

## 2025-02-16 PROCEDURE — 99283 EMERGENCY DEPT VISIT LOW MDM: CPT

## 2025-02-16 PROCEDURE — 12001 RPR S/N/AX/GEN/TRNK 2.5CM/<: CPT

## 2025-02-16 PROCEDURE — 99284 EMERGENCY DEPT VISIT MOD MDM: CPT

## 2025-02-17 NOTE — ED PROVIDER NOTES
Time reflects when diagnosis was documented in both MDM as applicable and the Disposition within this note       Time User Action Codes Description Comment    2/17/2025 12:47 AM Tony Benjamin Add [S61.419A] Hand laceration           ED Disposition       ED Disposition   Discharge    Condition   Stable    Date/Time   Mon Feb 17, 2025 12:47 AM    Comment   Thomagalys Downing discharge to home/self care.                   Assessment & Plan       Medical Decision Making  54-year-old male presents with laceration of his right hand caused by an X-Acto knife, just out of the package.  Up-to-date on tetanus.  On exam there is a small triangular shaped laceration to the palmar aspect of the right hand, just beneath just superior to the second MCP.  With any manipulation it does bleed a small amount.  Patient thoroughly irrigated the wound with tap water and soap.  It was then closed with glue.  We discussed supportive care and expectations for wound.  Return to ED if developing signs of infection.  Patient expresses understanding of the condition, treatment plan, follow-up instructions, and return precautions.               Medications - No data to display    ED Risk Strat Scores                                                History of Present Illness       Chief Complaint   Patient presents with    Hand Laceration     Patient is here with right index finger laceration that happened around 7pm with an exactor knife.       Past Medical History:   Diagnosis Date    Abdominal pain     Resolved: 6/26/17    Allergic rhinitis     Anal fissure, unspecified 05/18/2018    Anal pain 12/29/2023    Anxiety 06/2016    Asthma     childhood asthma     Atypical chest pain     Last assessed: 4/18/14    Carpal tunnel syndrome     Right.  Last assessed: 9/8/17    Colon polyp     Difficulty urinating 12/29/2023    Dyspepsia     Last assessed: 1/19/17    Ear problems     Encounter for screening for HIV 03/12/2018    Leukopenia 03/15/2022     Overweight (BMI 25.0-29.9) 03/14/2019    Paresthesia of both feet     Paresthesias     Rosacea     Seizures (HCC)     last seizures 10 years ago    Tinnitus       Past Surgical History:   Procedure Laterality Date    COLONOSCOPY      DENTAL SURGERY      Last assessed: 6/5/15, dental implants    [place] ESOPHAGOGASTRODUODENOSCOPY TRANSORAL DIAGNOSTIC N/A 2/13/2017    Procedure: EGD AND COLONOSCOPY;  Surgeon: Taj Marin MD;  Location:  [facility];  Service: Gastroenterology      Family History   Problem Relation Age of Onset    No Known Problems Mother     No Known Problems Father     Stroke Maternal Grandmother         Cerebrovascular accident    Colon cancer Maternal Grandmother     Cancer Maternal Grandmother     Depression Brother     Completed Suicide  Brother       Social History     Tobacco Use    Smoking status: Never    Smokeless tobacco: Never   Vaping Use    Vaping status: Never Used   Substance Use Topics    Alcohol use: Yes     Alcohol/week: 1.0 standard drink of alcohol     Types: 1 Standard drinks or equivalent per week     Comment: 3 drinks per month -  shots    Drug use: No      E-Cigarette/Vaping    E-Cigarette Use Never User       E-Cigarette/Vaping Substances    Nicotine No     THC No     CBD No     Flavoring No     Other No     Unknown No       I have reviewed and agree with the history as documented.     54-year-old male presents for evaluation of laceration to his right hand.  States this was caused by an X-Acto knife, states it was new and he was actually just taking it out of the package when he was cut.  He is up-to-date on tetanus states within the last few months.  Pain is minimal but states that the bleeding has just been persistent.  No numbness, tingling, weakness, decreased ROM.  He is positive that there was no residual foreign body.        Review of Systems   Constitutional:  Negative for chills and fever.   HENT:  Negative for ear pain and sore throat.    Eyes:  Negative for pain and  visual disturbance.   Respiratory:  Negative for cough and shortness of breath.    Cardiovascular:  Negative for chest pain and palpitations.   Gastrointestinal:  Negative for abdominal pain and vomiting.   Genitourinary:  Negative for dysuria and hematuria.   Musculoskeletal:  Negative for arthralgias and back pain.   Skin:  Positive for wound. Negative for color change and rash.   Neurological:  Negative for seizures and syncope.   All other systems reviewed and are negative.          Objective       ED Triage Vitals [02/16/25 2201]   Temperature Pulse Blood Pressure Respirations SpO2 Patient Position - Orthostatic VS   98.5 °F (36.9 °C) 72 (!) 165/104 18 97 % Sitting      Temp Source Heart Rate Source BP Location FiO2 (%) Pain Score    Oral Monitor Left arm -- --      Vitals      Date and Time Temp Pulse SpO2 Resp BP Pain Score FACES Pain Rating User   02/16/25 2201 98.5 °F (36.9 °C) 72 97 % 18 165/104 -- -- [facility]            Physical Exam  Vitals and nursing note reviewed.   Constitutional:       General: He is not in acute distress.     Appearance: He is well-developed.   HENT:      Head: Normocephalic and atraumatic.   Eyes:      Conjunctiva/sclera: Conjunctivae normal.   Cardiovascular:      Rate and Rhythm: Normal rate and regular rhythm.      Heart sounds: No murmur heard.  Pulmonary:      Effort: Pulmonary effort is normal. No respiratory distress.      Breath sounds: Normal breath sounds.   Abdominal:      Palpations: Abdomen is soft.      Tenderness: There is no abdominal tenderness.   Musculoskeletal:         General: No swelling.        Hands:       Cervical back: Neck supple.   Skin:     General: Skin is warm and dry.      Capillary Refill: Capillary refill takes less than 2 seconds.   Neurological:      Mental Status: He is alert.   Psychiatric:         Mood and Affect: Mood normal.         Results Reviewed       None            No orders to display       Universal Protocol:  procedure performed by  consultantConsent: Verbal consent obtained.  Risks and benefits: risks, benefits and alternatives were discussed  Consent given by: patient  Patient understanding: patient states understanding of the procedure being performed  Patient identity confirmed: verbally with patient  Laceration repair    Date/Time: 2/17/2025 12:30 AM    Performed by: Tony Benjamin PA-C  Authorized by: Tony Benjamin PA-C  Body area: upper extremity  Location details: right hand  Laceration length: 1 cm  Foreign bodies: no foreign bodies      Procedure Details:  Irrigation solution: tap water  Irrigation method: tap  Amount of cleaning: extensive  Debridement: none  Degree of undermining: none  Skin closure: glue  Approximation: close  Approximation difficulty: simple  Patient tolerance: patient tolerated the procedure well with no immediate complications          ED Medication and Procedure Management   Prior to Admission Medications   Prescriptions Last Dose Informant Patient Reported? Taking?   Tacrolimus 0.1 % CREA  Self Yes No   carBAMazepine (CARBATROL) 300 MG 12 hr capsule   No No   Sig: Take 1 capsule (300 mg total) by mouth 2 (two) times a day   doxycycline hyclate (VIBRAMYCIN) 100 mg capsule   Yes No   Sig: Take 1 capsule by mouth 2 (two) times a day   Patient not taking: Reported on 11/26/2024   emtricitabine-tenofovir (TRUVADA) 200-300 mg per tablet  Self Yes No   Sig: Take 1 tablet by mouth daily   escitalopram (Lexapro) 10 mg tablet   No No   Sig: Take 1 tablet (10 mg total) by mouth daily   famciclovir (FAMVIR) 500 mg tablet   No No   Sig: Take 1 tablet (500 mg total) by mouth 3 (three) times a day for 7 days   fluticasone (FLONASE) 50 mcg/act nasal spray  Self No No   Sig: SPRAY 2 SPRAYS INTO EACH NOSTRIL EVERY DAY   metroNIDAZOLE (METROGEL) 1 % gel  Self Yes No      Facility-Administered Medications: None     Discharge Medication List as of 2/17/2025 12:48 AM        CONTINUE these medications which have NOT  CHANGED    Details   carBAMazepine (CARBATROL) 300 MG 12 hr capsule Take 1 capsule (300 mg total) by mouth 2 (two) times a day, Starting Thu 12/26/2024, Normal      doxycycline hyclate (VIBRAMYCIN) 100 mg capsule Take 1 capsule by mouth 2 (two) times a day, Starting Thu 10/17/2024, Historical Med      emtricitabine-tenofovir (TRUVADA) 200-300 mg per tablet Take 1 tablet by mouth daily, Starting Thu 7/13/2023, Historical Med      escitalopram (Lexapro) 10 mg tablet Take 1 tablet (10 mg total) by mouth daily, Starting Tue 4/23/2024, Normal      famciclovir (FAMVIR) 500 mg tablet Take 1 tablet (500 mg total) by mouth 3 (three) times a day for 7 days, Starting Fri 11/29/2024, Until Fri 12/6/2024, Normal      fluticasone (FLONASE) 50 mcg/act nasal spray SPRAY 2 SPRAYS INTO EACH NOSTRIL EVERY DAY, Normal      metroNIDAZOLE (METROGEL) 1 % gel Starting Tue 5/8/2018, Historical Med      Tacrolimus 0.1 % CREA Starting Tue 3/7/2023, Historical Med           No discharge procedures on file.  ED SEPSIS DOCUMENTATION   Time reflects when diagnosis was documented in both MDM as applicable and the Disposition within this note       Time User Action Codes Description Comment    2/17/2025 12:47 AM Tony Benjamin Add [S61.419A] Hand laceration                  Tony Benjamin PA-C  02/17/25 [id number]

## 2025-02-17 NOTE — DISCHARGE INSTRUCTIONS
The wound edges are held together with glue.  Allow the glue to fall off on its own over the next several days, he can wash your hands normally but try not to vigorously rub the area.  May help to cover with a Band-Aid to prevent things from catching on it.  Wounds on the hand heal very quickly and usually without any complication.  You mention that you are up-to-date on tetanus so we did not give you a booster.

## 2025-04-15 NOTE — PROGRESS NOTES
PRE-OPERATIVE NOTE     Chief Complaint had concerns including Pre-Op Exam and Office Visit (DOS 5/7/2025 [place] DR. Ellis cataract).  Surgery Date: 5/7/2025  Planned Surgery: cataract left eye  Pre-op Diagnosis: cataract, bilateral  Requesting Surgeon: Satish Fitzpatrick    Subjective   BRIEF HISTORY LEADING to SURGERY: Gian Varma is a 79 year old male here for pre-operative anesthesia consult for surgery as requested by the surgeon.     Review of Systems  Constitutional: normal  Eyes: abnormal blurry vision, no pain or drainage  HENT: normal  Respiratory: normal  Cardiovascular: normal.  Patient is able to climb a flight of stairs without cardiac symptoms.    Gastrointestinal: normal  Genitourinary: normal  Neurologic: normal, becoming a little forgetful.   Musculoskeletal: normal    Objective   PHYSICAL EXAM  Vitals:    04/15/25 1057   BP: 120/70   Pulse: 62   Resp: 16   SpO2: 100%   Weight: 74.3 kg (163 lb 12.8 oz)     Physical Exam  Head: Normocephalic. Atraumatic.    Eyes: Pupils equal, round and reactive to light. Conjunctivae clear.    Ear: Tympanic membranes and external ear canals normal bilaterally.    Nose: Nares normal. No sinus tenderness.    Throat: Lips, mucosa, and tongue normal. Teeth and gums normal. Pharyngeal mucosa non-inflamed.    Neck: Supple. Thyroid is normal.    Cardiovascular: Regular rate and rhythm. Normal S1, S2. No murmurs.    Respiratory: Normal respiratory effort. Clear to auscultation. No wheezing, rales or rhonchi.        RESULTS REVIEW         Most Recent  Na+  142 (2/21/2024)  K+  3.9 (2/21/2024)   Glucose  101 (2/21/2024)  GFR  78 (2/21/2024)        Most Recent  WBC  10.5 (2/21/2024)  HGB  14.1 (2/21/2024)     Platelet  250 (2/21/2024)              Medications, allergies, past medical, surgical, social and family histories were reviewed and updated as appropriate, see encounter summary. Pertinent surgical risks and history are below.    SURGICAL RISK AND SCREENINGS  [facility]    NAME: Maria Teresa Braga  AGE: 49 y o  SEX: male  : 1970     DATE: 2021     Assessment and Plan:     Problem List Items Addressed This Visit        Digestive    Tubular adenoma of colon     He has received the reminder to schedule his colonoscopy and will call            Nervous and Auditory    Seizure disorder [facility])     He attempted to wean off the carbamazepine around 6 years ago and did not feel well  That was around the time he was experiencing anxiety which could have caused the symptoms  He is experiencing difficulty reaching an orgasm which could be from escitalopram and is interested in bupropion  However, this can lower the seizure threshold so may not be the best choice  I will reach out to our psychiatrist and see what they can recommend  Other    Anxiety disorder     Overall controlled on escitalopram           Other Visit Diagnoses     Annual physical exam    -  Primary    Screening PSA (prostate specific antigen)        Relevant Orders    PSA, Total Screen    Laboratory exam ordered as part of routine general medical examination        Relevant Orders    CBC and differential    Comprehensive metabolic panel    Lipid Panel with Direct LDL reflex        Depression Screening Follow-up Plan: Patient's depression screening was positive with a PHQ-2 score of 3  Their PHQ-9 score was 3  Patient's depressive symptoms likely due to other medical condition  Would recommend treatment of underlying condition  Will continue to monitor at next office visit  Immunizations and preventive care screenings were discussed with patient today  Appropriate education was printed on patient's after visit summary  Discussed Shingrix which he prefers to get from his nearby pharmacy    Counseling:  · Follow up at [place] [id number] for Prep         Return in about 1 year (around 2022)       Chief Complaint:     Family History Risks  Adverse Reaction to Anesthesia: No  Bleeding Problems: No  Malignant Hyperthermia: No    Personal Surgical History  Anesthetic Complications: No  Bleeding Problems: No  Problems with Surgery: No       Malnutrition Screening Tool Current Weight 164 lbs  Have you recently lost weight without trying? No  Have you been eating poorly because of a decreased appetite? No  Score = 0, Not at Risk       Functional Capacity  Are you able to do light housework, dusting, wash dishes, climb a flight of stairs or walk up a hill without chest pain or problems breathing (>4 METS)? Yes    Revised Cardiac Risk Index  High Risk Surgery  (Intraperitoneal; intrathoracic; suprainguinal vascular) No   History of Ischemic Heart Disease or Cardiac Chest Pain Yes; S/P Cabg X 3   Congestive Heart Failure No; Ejection Fraction 45 (03/09/2023)   History of Stroke or TIA No   Last Creatinine >2 No; 1 g/dL (02/21/2024)   Prescribed Insulin No   Estimated Risk of a Major Cardiac Complication 1 risk factor = 6.0%       Acute Kidney Injury Prevention:  MAILNDA Risk is Medium based on criteria below:  Atherosclerotic Heart Disease  Age >65  Recommendations  - 24 hours before surgery hold ACE-I/ARB, Diuretics and Potassium  - 5 days before surgery hold NSAIDs    Advance care planning documents on file - yes     ASSESSMENT AND PLAN        1. Pre-op evaluation  2. Cataract of both eyes, unspecified cataract type    Preop Optimization  - OPTIMIZED for SURGERY: YES patient is medically optimized for surgery.  - Workup reviewed and/or ordered: no additional workup needed  - Pre-Op management of pacemaker, dialysis or steroids: not needed.  - Post-Op DVT prophylaxis: per surgical team  - Smoking Status: Former Smoker (Quit 01/01/1987).     Pertinent Conditions     #S/P Cabg X 3    Stress test 2/21/2024  IMPRESSION  Nonischemic response electrocardiographically  Regadenoson related symptoms that resolve promptly in recovery  Myocardial  Chief Complaint   Patient presents with    Annual Exam     + PHQ9, needs plan      History of Present Illness:     Adult Annual Physical   Patient here for a comprehensive physical exam  The patient reports problems - difficulty reaching orgasm  Diet and Physical Activity  · Diet/Nutrition: well balanced diet and and intermittent fasitng x 2 weeks  · Exercise: 3-4 times a week on average  He has gradually lost weight in the past year from a healthier lifestyle    Depression Screening  PHQ-9 Depression Screening    PHQ-9:   Frequency of the following problems over the past two weeks:      Little interest or pleasure in doing things: 1 - several days  Feeling down, depressed, or hopeless: 2 - more than half the days  Trouble falling or staying asleep, or sleeping too much: 0 - not at all  Feeling tired or having little energy: 0 - not at all  Poor appetite or overeatin - not at all  Feeling bad about yourself - or that you are a failure or have let yourself or your family down: 0 - not at all  Trouble concentrating on things, such as reading the newspaper or watching television: 0 - not at all  Moving or speaking so slowly that other people could have noticed  Or the opposite - being so fidgety or restless that you have been moving around a lot more than usual: 0 - not at all  Thoughts that you would be better off dead, or of hurting yourself in some way: 0 - not at all  PHQ-2 Score: 3  PHQ-9 Score: 3       General Health  · Sleep: sleeps well  · Hearing: normal - bilateral   · Vision: most recent eye exam <1 year ago and previous LASIK surgery  · Dental: regular dental visits   Health  · Symptoms include: none     Review of Systems:     Review of Systems   Constitutional: Negative for appetite change, chills, fatigue, fever and unexpected weight change  HENT: Negative for congestion  Respiratory: Negative for cough and shortness of breath      Cardiovascular: Negative for chest pain, palpitations and leg swelling  Gastrointestinal: Negative for abdominal pain, constipation, diarrhea, nausea and vomiting  Genitourinary: Negative for difficulty urinating  Difficulty reaching orgasm and interested in Wellbutrin  Occasional dribbling   Musculoskeletal: Negative for arthralgias and myalgias  Neurological: Negative for dizziness and headaches  Psychiatric/Behavioral: Negative for sleep disturbance  The patient is nervous/anxious (overall controlled on Lexapro)  Past Medical History:     Past Medical History:   Diagnosis Date    Abdominal pain     Resolved: 6/26/17    Anal fissure, unspecified 5/18/2018    Asthma     childhood asthma     Atypical chest pain     Last assessed: 4/18/14    Carpal tunnel syndrome     Right  Last assessed: 9/8/17    Dyspepsia     Last assessed: 1/19/17    Encounter for screening for HIV 3/12/2018    History of colon polyps     Overweight (BMI 25 0-29 9) 3/14/2019    Paresthesia of both feet     Paresthesias     Rosacea     Screen for colon cancer 3/12/2018    Screening for diabetes mellitus 3/12/2018    Screening, lipid 3/12/2018    Seizures ([address] )     Wellness examination 3/12/2018      Past Surgical History:     Past Surgical History:   Procedure Laterality Date    DENTAL SURGERY      Last assessed: 6/5/15    CO ESOPHAGOGASTRODUODENOSCOPY TRANSORAL DIAGNOSTIC N/A 2/13/2017    Procedure: EGD AND COLONOSCOPY;  Surgeon: Opal Cooks, MD;  Location: [facility];   Service: Gastroenterology      Family History:     Family History   Problem Relation Age of Onset    Stroke Maternal Grandmother         Cerebrovascular accident    Colon cancer Maternal Grandmother     No Known Problems Mother     No Known Problems Father       Social History:     Social History     Socioeconomic History    Marital status: Single     Spouse name: None    Number of children: None    Years of education: None    Highest education level: None   Occupational perfusion scan:  NM stress test normal without any reversible defects noted.         Before Surgery/Procedure Hold (Do Not Take) these Medications  Medications and Supplements:  - Morning of surgery hold any Vitamins AND for 2 weeks prior hold any Vitamin E or Herbals     Anticoagulation:    - Continue taking for patients with coronary artery disease or stroke (aspirin 81 MG chewable tablet [[id number]])  - 7 days before surgery hold (clopidogrel (PLAVIX) 75 MG tablet [[id number]])    Antidiabetic: none on med list    MALINDA Risk (Diuretics, ACE-I/ARB, NSAIDs): none on med list      Continue all other medications unless an alternative plan was advised with the surgeon and/or specialist.          Electronically signed by: Marino Rizzo DO  Copy sent to: Satish Fitzpatrick               History    None   Tobacco Use    Smoking status: Never Smoker    Smokeless tobacco: Never Used   Substance and Sexual Activity    Alcohol use: Yes     Comment: occasional    Drug use: No    Sexual activity: None   Other Topics Concern    None   Social History Narrative    None     Social Determinants of Health     Financial Resource Strain:     Difficulty of Paying Living Expenses:    Food Insecurity:     Worried About Running Out of Food in the Last Year:     Ran Out of Food in the Last Year:    Transportation Needs:     Lack of Transportation (Medical):  Lack of Transportation (Non-Medical):    Physical Activity:     Days of Exercise per Week:     Minutes of Exercise per Session:    Stress:     Feeling of Stress :    Social Connections:     Frequency of Communication with Friends and Family:     Frequency of Social Gatherings with Friends and Family:     Attends Holiness Services:     Active Member of Clubs or Organizations:     Attends Club or Organization Meetings:     Marital Status:    Intimate Partner Violence:     Fear of Current or Ex-Partner:     Emotionally Abused:     Physically Abused:     Sexually Abused:       Current Medications:     Current Outpatient Medications   Medication Sig Dispense Refill    carBAMazepine (CARBATROL) 300 MG 12 hr capsule TAKE 1 CAPSULE BY MOUTH TWICE A  capsule 4    Carbamazepine, Antipsychotic, 300 MG CP12 Take 1 capsule by mouth 2 (two) times a day      escitalopram (LEXAPRO) 5 mg tablet TAKE 1 TABLET BY MOUTH EVERY DAY 90 tablet 3    Ivermectin (SOOLANTRA) 1 % CREA Apply topically      metroNIDAZOLE (METROGEL) 1 % gel       Sulfacetamide Sodium-Sulfur 9 8-4 8 % LIQD        No current facility-administered medications for this visit        Allergies:     No Known Allergies   Physical Exam:     /78   Pulse 72   Resp 16   Ht 5' 9" (1 753 m)   Wt 76 9 kg (169 lb 9 6 oz)   SpO2 97%   BMI 25 05 kg/m²     Physical Exam  Vitals and nursing note reviewed  Constitutional:       General: He is not in acute distress  Appearance: He is well-developed  He is not ill-appearing, toxic-appearing or diaphoretic  HENT:      Head: Normocephalic and atraumatic  Eyes:      Conjunctiva/sclera: Conjunctivae normal    Cardiovascular:      Rate and Rhythm: Normal rate and regular rhythm  Heart sounds: No murmur heard  Pulmonary:      Effort: Pulmonary effort is normal  No respiratory distress  Breath sounds: Normal breath sounds  Abdominal:      General: There is no distension  Palpations: Abdomen is soft  There is no mass  Tenderness: There is no abdominal tenderness  There is no guarding or rebound  Musculoskeletal:      Cervical back: Neck supple  Right lower leg: No edema  Left lower leg: No edema  Skin:     General: Skin is warm and dry  Neurological:      Mental Status: He is alert and oriented to person, place, and time  Psychiatric:         Mood and Affect: Mood normal          Behavior: Behavior normal          Thought Content:  Thought content normal          Judgment: Judgment normal           Blake Peck MD  [facility]

## 2025-04-16 DIAGNOSIS — F41.1 GENERALIZED ANXIETY DISORDER: ICD-10-CM

## 2025-04-17 RX ORDER — ESCITALOPRAM OXALATE 10 MG/1
10 TABLET ORAL DAILY
Qty: 90 TABLET | Refills: 1 | Status: SHIPPED | OUTPATIENT
Start: 2025-04-17

## 2025-06-25 ENCOUNTER — APPOINTMENT (OUTPATIENT)
Dept: LAB | Facility: HOSPITAL | Age: 55
End: 2025-06-25
Payer: COMMERCIAL

## 2025-06-25 DIAGNOSIS — L30.9 ACUTE DERMATITIS: ICD-10-CM

## 2025-06-25 PROCEDURE — 86038 ANTINUCLEAR ANTIBODIES: CPT

## 2025-06-25 PROCEDURE — 36415 COLL VENOUS BLD VENIPUNCTURE: CPT

## 2025-06-25 PROCEDURE — 86225 DNA ANTIBODY NATIVE: CPT

## 2025-07-03 LAB
DSDNA IGG SERPL IA-ACNC: 1 IU/ML (ref ?–15)
NUCLEAR IGG SER IA-RTO: <0.09 RATIO (ref ?–1)

## 2025-08-16 ENCOUNTER — APPOINTMENT (OUTPATIENT)
Dept: LAB | Facility: HOSPITAL | Age: 55
End: 2025-08-16
Payer: COMMERCIAL

## 2025-08-16 DIAGNOSIS — Z13.220 SCREENING, LIPID: ICD-10-CM

## 2025-08-16 DIAGNOSIS — Z00.00 LABORATORY EXAM ORDERED AS PART OF ROUTINE GENERAL MEDICAL EXAMINATION: ICD-10-CM

## 2025-08-16 DIAGNOSIS — Z12.5 SCREENING PSA (PROSTATE SPECIFIC ANTIGEN): ICD-10-CM

## 2025-08-16 LAB
ALBUMIN SERPL BCG-MCNC: 4.3 G/DL (ref 3.5–5)
ALP SERPL-CCNC: 94 U/L (ref 34–104)
ALT SERPL W P-5'-P-CCNC: 17 U/L (ref 7–52)
ANION GAP SERPL CALCULATED.3IONS-SCNC: 3 MMOL/L (ref 4–13)
AST SERPL W P-5'-P-CCNC: 29 U/L (ref 13–39)
BASOPHILS # BLD AUTO: 0.05 THOUSANDS/ÂΜL (ref 0–0.1)
BASOPHILS NFR BLD AUTO: 1 % (ref 0–1)
BILIRUB SERPL-MCNC: 0.49 MG/DL (ref 0.2–1)
BUN SERPL-MCNC: 14 MG/DL (ref 5–25)
CALCIUM SERPL-MCNC: 8.9 MG/DL (ref 8.4–10.2)
CHLORIDE SERPL-SCNC: 103 MMOL/L (ref 96–108)
CHOLEST SERPL-MCNC: 217 MG/DL (ref ?–200)
CO2 SERPL-SCNC: 31 MMOL/L (ref 21–32)
CREAT SERPL-MCNC: 0.96 MG/DL (ref 0.6–1.3)
EOSINOPHIL # BLD AUTO: 0.14 THOUSAND/ÂΜL (ref 0–0.61)
EOSINOPHIL NFR BLD AUTO: 3 % (ref 0–6)
ERYTHROCYTE [DISTWIDTH] IN BLOOD BY AUTOMATED COUNT: 12 % (ref 11.6–15.1)
GFR SERPL CREATININE-BSD FRML MDRD: 89 ML/MIN/1.73SQ M
GLUCOSE P FAST SERPL-MCNC: 91 MG/DL (ref 65–99)
HCT VFR BLD AUTO: 44.4 % (ref 36.5–49.3)
HDLC SERPL-MCNC: 61 MG/DL
HGB BLD-MCNC: 15.6 G/DL (ref 12–17)
IMM GRANULOCYTES # BLD AUTO: 0.16 THOUSAND/UL (ref 0–0.2)
IMM GRANULOCYTES NFR BLD AUTO: 4 % (ref 0–2)
LDLC SERPL CALC-MCNC: 131 MG/DL (ref 0–100)
LYMPHOCYTES # BLD AUTO: 1.03 THOUSANDS/ÂΜL (ref 0.6–4.47)
LYMPHOCYTES NFR BLD AUTO: 25 % (ref 14–44)
MCH RBC QN AUTO: 32.8 PG (ref 26.8–34.3)
MCHC RBC AUTO-ENTMCNC: 35.1 G/DL (ref 31.4–37.4)
MCV RBC AUTO: 94 FL (ref 82–98)
MONOCYTES # BLD AUTO: 0.4 THOUSAND/ÂΜL (ref 0.17–1.22)
MONOCYTES NFR BLD AUTO: 10 % (ref 4–12)
NEUTROPHILS # BLD AUTO: 2.33 THOUSANDS/ÂΜL (ref 1.85–7.62)
NEUTS SEG NFR BLD AUTO: 57 % (ref 43–75)
NONHDLC SERPL-MCNC: 156 MG/DL
NRBC BLD AUTO-RTO: 0 /100 WBCS
PLATELET # BLD AUTO: 183 THOUSANDS/UL (ref 149–390)
PMV BLD AUTO: 11 FL (ref 8.9–12.7)
POTASSIUM SERPL-SCNC: 4.7 MMOL/L (ref 3.5–5.3)
PROT SERPL-MCNC: 6.8 G/DL (ref 6.4–8.4)
PSA SERPL-MCNC: 0.38 NG/ML (ref 0–4)
RBC # BLD AUTO: 4.75 MILLION/UL (ref 3.88–5.62)
SODIUM SERPL-SCNC: 137 MMOL/L (ref 135–147)
TRIGL SERPL-MCNC: 127 MG/DL (ref ?–150)
WBC # BLD AUTO: 4.11 THOUSAND/UL (ref 4.31–10.16)

## 2025-08-16 PROCEDURE — 80053 COMPREHEN METABOLIC PANEL: CPT

## 2025-08-16 PROCEDURE — 80061 LIPID PANEL: CPT

## 2025-08-16 PROCEDURE — G0103 PSA SCREENING: HCPCS

## 2025-08-16 PROCEDURE — 85025 COMPLETE CBC W/AUTO DIFF WBC: CPT

## 2025-08-16 PROCEDURE — 36415 COLL VENOUS BLD VENIPUNCTURE: CPT

## 2025-08-19 ENCOUNTER — OFFICE VISIT (OUTPATIENT)
Dept: INTERNAL MEDICINE CLINIC | Facility: CLINIC | Age: 55
End: 2025-08-19
Payer: COMMERCIAL

## 2025-08-19 VITALS
OXYGEN SATURATION: 98 % | WEIGHT: 178 LBS | SYSTOLIC BLOOD PRESSURE: 150 MMHG | DIASTOLIC BLOOD PRESSURE: 80 MMHG | HEART RATE: 70 BPM | BODY MASS INDEX: 26.36 KG/M2 | HEIGHT: 69 IN

## 2025-08-19 DIAGNOSIS — G40.909 SEIZURE DISORDER (HCC): ICD-10-CM

## 2025-08-19 DIAGNOSIS — Z00.00 ANNUAL PHYSICAL EXAM: Primary | ICD-10-CM

## 2025-08-19 DIAGNOSIS — F41.1 GENERALIZED ANXIETY DISORDER: ICD-10-CM

## 2025-08-19 DIAGNOSIS — Z12.5 SCREENING PSA (PROSTATE SPECIFIC ANTIGEN): ICD-10-CM

## 2025-08-19 DIAGNOSIS — L71.9 ROSACEA: ICD-10-CM

## 2025-08-19 DIAGNOSIS — Z00.00 LABORATORY EXAM ORDERED AS PART OF ROUTINE GENERAL MEDICAL EXAMINATION: ICD-10-CM

## 2025-08-19 DIAGNOSIS — Z23 ENCOUNTER FOR IMMUNIZATION: ICD-10-CM

## 2025-08-19 DIAGNOSIS — E78.2 MIXED HYPERLIPIDEMIA: ICD-10-CM

## 2025-08-19 PROBLEM — R21 RASH: Status: RESOLVED | Noted: 2024-11-29 | Resolved: 2025-08-19

## 2025-08-19 PROCEDURE — 90471 IMMUNIZATION ADMIN: CPT

## 2025-08-19 PROCEDURE — 99396 PREV VISIT EST AGE 40-64: CPT | Performed by: INTERNAL MEDICINE

## 2025-08-19 PROCEDURE — 90750 HZV VACC RECOMBINANT IM: CPT

## (undated) DEVICE — THE EXACTO COLD SNARE IS INTENDED TO BE USED WITHOUT DIATHERMIC ENERGY FOR THE ENDOSCOPIC RESECTION OF POLYP TISSUE IN THE GASTROINTESTINAL TRACT.: Brand: EXACTO